# Patient Record
Sex: FEMALE | Race: WHITE | NOT HISPANIC OR LATINO | Employment: OTHER | URBAN - METROPOLITAN AREA
[De-identification: names, ages, dates, MRNs, and addresses within clinical notes are randomized per-mention and may not be internally consistent; named-entity substitution may affect disease eponyms.]

---

## 2017-12-08 ENCOUNTER — TRANSCRIBE ORDERS (OUTPATIENT)
Dept: ADMINISTRATIVE | Facility: HOSPITAL | Age: 72
End: 2017-12-08

## 2017-12-08 DIAGNOSIS — Z12.39 BREAST SCREENING: Primary | ICD-10-CM

## 2018-01-11 ENCOUNTER — HOSPITAL ENCOUNTER (OUTPATIENT)
Dept: MAMMOGRAPHY | Facility: HOSPITAL | Age: 73
Discharge: HOME | End: 2018-01-11
Admitting: FAMILY MEDICINE

## 2018-01-11 DIAGNOSIS — Z12.39 BREAST SCREENING: ICD-10-CM

## 2018-01-11 PROCEDURE — 77063 BREAST TOMOSYNTHESIS BI: CPT

## 2018-01-11 PROCEDURE — 77067 SCR MAMMO BI INCL CAD: CPT

## 2018-12-04 ENCOUNTER — TRANSCRIBE ORDERS (OUTPATIENT)
Dept: ADMINISTRATIVE | Facility: HOSPITAL | Age: 73
End: 2018-12-04

## 2018-12-04 DIAGNOSIS — Z12.39 SCREENING BREAST EXAMINATION: Primary | ICD-10-CM

## 2019-01-15 ENCOUNTER — HOSPITAL ENCOUNTER (OUTPATIENT)
Dept: MAMMOGRAPHY | Facility: HOSPITAL | Age: 74
Discharge: HOME | End: 2019-01-15
Admitting: FAMILY MEDICINE

## 2019-01-15 DIAGNOSIS — Z12.39 SCREENING BREAST EXAMINATION: ICD-10-CM

## 2019-01-15 PROCEDURE — 77063 BREAST TOMOSYNTHESIS BI: CPT

## 2019-01-15 PROCEDURE — 77067 SCR MAMMO BI INCL CAD: CPT

## 2019-12-19 RX ORDER — LOVASTATIN 20 MG/1
20 TABLET ORAL NIGHTLY
COMMUNITY
Start: 2014-06-03

## 2019-12-19 RX ORDER — LOPERAMIDE HYDROCHLORIDE 2 MG/1
2 CAPSULE ORAL DAILY
COMMUNITY
Start: 2015-10-08 | End: 2020-06-30

## 2019-12-19 RX ORDER — METOPROLOL SUCCINATE 50 MG/1
50 TABLET, EXTENDED RELEASE ORAL DAILY
COMMUNITY
Start: 2014-06-03 | End: 2019-12-20 | Stop reason: SDUPTHER

## 2019-12-19 RX ORDER — CITALOPRAM 20 MG/1
20 TABLET ORAL DAILY
COMMUNITY
Start: 2018-08-15

## 2019-12-19 RX ORDER — LEVOTHYROXINE SODIUM 88 UG/1
88 TABLET ORAL DAILY
COMMUNITY
Start: 2018-12-14

## 2019-12-19 RX ORDER — ESTRADIOL 2 MG/1
2 TABLET ORAL DAILY
COMMUNITY
Start: 2014-06-03 | End: 2020-06-30

## 2019-12-19 RX ORDER — DICLOFENAC SODIUM 75 MG/1
75 TABLET, DELAYED RELEASE ORAL 2 TIMES DAILY
COMMUNITY
Start: 2014-06-03 | End: 2021-12-23

## 2019-12-20 ENCOUNTER — TELEPHONE (OUTPATIENT)
Dept: CARDIOLOGY | Facility: CLINIC | Age: 74
End: 2019-12-20

## 2019-12-20 ENCOUNTER — OFFICE VISIT (OUTPATIENT)
Dept: CARDIOLOGY | Facility: CLINIC | Age: 74
End: 2019-12-20

## 2019-12-20 VITALS
DIASTOLIC BLOOD PRESSURE: 88 MMHG | SYSTOLIC BLOOD PRESSURE: 184 MMHG | HEIGHT: 62 IN | BODY MASS INDEX: 26.5 KG/M2 | HEART RATE: 68 BPM | WEIGHT: 144 LBS

## 2019-12-20 DIAGNOSIS — R94.31 ABNORMAL EKG: ICD-10-CM

## 2019-12-20 DIAGNOSIS — Z01.810 PREOP CARDIOVASCULAR EXAM: Primary | ICD-10-CM

## 2019-12-20 PROCEDURE — 93000 ELECTROCARDIOGRAM COMPLETE: CPT | Performed by: INTERNAL MEDICINE

## 2019-12-20 PROCEDURE — 99204 OFFICE O/P NEW MOD 45 MIN: CPT | Performed by: INTERNAL MEDICINE

## 2019-12-20 RX ORDER — METOPROLOL SUCCINATE 50 MG/1
50 TABLET, EXTENDED RELEASE ORAL 2 TIMES DAILY
Qty: 180 TABLET | Refills: 3 | Status: SHIPPED | OUTPATIENT
Start: 2019-12-20 | End: 2020-10-06

## 2019-12-20 NOTE — PROGRESS NOTES
Date of Office Visit: 2019  Encounter Provider: Adenike Pelletier MD  Place of Service: [facility] CARDIOLOGY  Patient Name: Janna Moy  :1945    Chief complaint  Consult requested by Dr. Alcantara for preoperative clearance prior to left total hip arthroplasty.    History of Present Illness  Patient is a 74-year-old gentleman with history of hypertension hyperlipidemia deep venous thrombosis and hypothyroidism.  She is scheduled for knee surgery on  with Dr. Guadarrama.  She is fairly limited in her activity due to hip pain.  She denies any chest pain, palpitations, syncope near syncope shortness of breath.  She believes her blood pressures lower in the 130s to 140s at home though her device has not been checked regularly.  She was started on lisinopril 2 months ago.  Her EKG was noted to be abnormal with poor R wave progression in the septal leads and mild ST depression in the lateral leads noted on today's EKG.    Past Medical History:   Diagnosis Date   • Deep vein thrombosis (CMS/HCC)    • Hyperlipidemia    • Hypertension    • Hypothyroidism      No past surgical history on file.  Outpatient Medications Prior to Visit   Medication Sig Dispense Refill   • aspirin 81 MG tablet Take 81 mg by mouth Daily.     • citalopram (CeleXA) 20 MG tablet Take 20 mg by mouth Daily.     • diclofenac (VOLTAREN) 75 MG EC tablet Take 75 mg by mouth Daily.     • estradiol (ESTRACE) 2 MG tablet Take 2 mg by mouth Daily.     • levothyroxine (SYNTHROID, LEVOTHROID) 125 MCG tablet TAKE 1 TABLET EVERY DAY     • loperamide (IMODIUM) 2 MG capsule Take 2 mg by mouth Daily.     • lovastatin (MEVACOR) 20 MG tablet Take 20 mg by mouth Every Night.     • metoprolol succinate XL (TOPROL-XL) 50 MG 24 hr tablet Take 50 mg by mouth Daily.       No facility-administered medications prior to visit.        Allergies as of 2019 - Reviewed 2019   Allergen Reaction Noted   • Codeine Delirium  "2014   • Sulfa antibiotics Other (See Comments) 2012     Social History     Socioeconomic History   • Marital status:      Spouse name: Not on file   • Number of children: Not on file   • Years of education: Not on file   • Highest education level: Not on file   Tobacco Use   • Smoking status: Former Smoker     Types: Cigarettes     Last attempt to quit:      Years since quittin.9     Family History   Problem Relation Age of Onset   • Hypertension Father    • Breast cancer Neg Hx      Review of Systems   Constitution: Negative for fever, malaise/fatigue, weight gain and weight loss.   HENT: Negative for ear pain, hearing loss, nosebleeds and sore throat.    Eyes: Negative for double vision, pain, vision loss in left eye and vision loss in right eye.   Cardiovascular:        See history of present illness.   Respiratory: Negative for cough, shortness of breath, sleep disturbances due to breathing, snoring and wheezing.    Endocrine: Negative for cold intolerance, heat intolerance and polyuria.   Skin: Negative for itching, poor wound healing and rash.   Musculoskeletal: Negative for joint pain, joint swelling and myalgias.   Gastrointestinal: Negative for abdominal pain, diarrhea, hematochezia, nausea and vomiting.   Genitourinary: Negative for hematuria and hesitancy.   Neurological: Negative for numbness, paresthesias and seizures.   Psychiatric/Behavioral: Negative for depression. The patient is not nervous/anxious.         Objective:     Vitals:    19 0757 19 0814   BP: (!) 188/92 (!) 184/88   BP Location: Right arm Left arm   Pulse: 68    Weight: 65.3 kg (144 lb)    Height: 157.5 cm (62\")      Body mass index is 26.34 kg/m².    Physical Exam   Constitutional: She is oriented to person, place, and time. She appears well-developed and well-nourished.   HENT:   Head: Normocephalic.   Nose: Nose normal.   Mouth/Throat: Oropharynx is clear and moist.   Eyes: Pupils are equal, " round, and reactive to light. Conjunctivae and EOM are normal. Right eye exhibits no discharge. No scleral icterus.   Neck: Normal range of motion. Neck supple. No JVD present. No thyromegaly present.   Cardiovascular: Normal rate, regular rhythm, normal heart sounds and intact distal pulses. Exam reveals no gallop and no friction rub.   No murmur heard.  Pulses:       Carotid pulses are 2+ on the right side, and 2+ on the left side.       Radial pulses are 2+ on the right side, and 2+ on the left side.        Femoral pulses are 2+ on the right side, and 2+ on the left side.       Popliteal pulses are 2+ on the right side, and 2+ on the left side.        Dorsalis pedis pulses are 2+ on the right side, and 2+ on the left side.        Posterior tibial pulses are 2+ on the right side, and 2+ on the left side.   Pulmonary/Chest: Effort normal and breath sounds normal. No respiratory distress. She has no wheezes. She has no rales.   Abdominal: Soft. Bowel sounds are normal. She exhibits no distension. There is no hepatosplenomegaly. There is no tenderness. There is no rebound.   Musculoskeletal: Normal range of motion. She exhibits no edema or tenderness.   Neurological: She is alert and oriented to person, place, and time.   Skin: Skin is warm and dry. No rash noted. No erythema.   Psychiatric: She has a normal mood and affect. Her behavior is normal. Judgment and thought content normal.   Vitals reviewed.    Lab Review:     ECG 12 Lead  Date/Time: 12/20/2019 8:01 AM  Performed by: Adenike Pelletier MD  Authorized by: Adenike Pelletier MD   Comparison: not compared with previous ECG   Rhythm: sinus rhythm    Clinical impression: normal ECG          Assessment:       Diagnosis Plan   1. Preop cardiovascular exam  ECG 12 Lead    Stress Test With Myocardial Perfusion One Day   2. Abnormal EKG  Stress Test With Myocardial Perfusion One Day     Plan:       1.  Preoperative clearance.  Recommended increasing her Toprol to 50 mg twice  daily for better blood pressure control and check a Lexiscan Cardiolite stress test early next week.  If this is normal, she can proceed on with surgery.    2.  Hypertension.  Quite elevated today and she will bring in her cuff for comparison.  Will increase metoprolol as above.  3.  Hyperlipidemia, controlled  4.  Hypothyroidism.  TSH level suggesting over suppression.  She will discuss further with Dr. Alcantara.  5.  Arthritis.  6.  Chronic aspirin use.  She has been using this prophylactically.  I reviewed with her that as long as there is no evidence of significant coronary disease (normal stress test) risks may be greater than benefits.         Your medication list           Accurate as of December 20, 2019 11:59 PM. If you have any questions, ask your nurse or doctor.               CHANGE how you take these medications      Instructions Last Dose Given Next Dose Due   metoprolol succinate XL 50 MG 24 hr tablet  Commonly known as:  TOPROL-XL  What changed:  when to take this  Changed by:  Adenike Pelletier MD      Take 1 tablet by mouth 2 (Two) Times a Day.          CONTINUE taking these medications      Instructions Last Dose Given Next Dose Due   aspirin 81 MG tablet      Take 81 mg by mouth Daily.       citalopram 20 MG tablet  Commonly known as:  CeleXA      Take 20 mg by mouth Daily.       diclofenac 75 MG EC tablet  Commonly known as:  VOLTAREN      Take 75 mg by mouth Daily.       estradiol 2 MG tablet  Commonly known as:  ESTRACE      Take 2 mg by mouth Daily.       levothyroxine 125 MCG tablet  Commonly known as:  SYNTHROID, LEVOTHROID      TAKE 1 TABLET EVERY DAY       loperamide 2 MG capsule  Commonly known as:  IMODIUM      Take 2 mg by mouth Daily.       lovastatin 20 MG tablet  Commonly known as:  MEVACOR      Take 20 mg by mouth Every Night.             Where to Get Your Medications      These medications were sent to [facility] - [facility] [address] - [phone]  -  [phone] FX  [address], [facility] [address]    Phone:  [phone]   · metoprolol succinate XL 50 MG 24 hr tablet         Patient is no longer taking -.  I corrected the med list to reflect this.  I did not stop these medications.    Dictated utilizing Dragon dictation

## 2019-12-20 NOTE — TELEPHONE ENCOUNTER
/ Prabhu, Monday is completely full on the bp/ekg schedule so I have to get approval to add another patient.  Will ask Michelle.    ROBB Martinez would like to add a bp on for Monday after 11:45am.  There arent any current openings on for Monday 12/23.    Thanks

## 2019-12-22 PROBLEM — R94.31 ABNORMAL EKG: Status: ACTIVE | Noted: 2019-12-22

## 2019-12-22 PROBLEM — Z01.810 PREOP CARDIOVASCULAR EXAM: Status: ACTIVE | Noted: 2019-12-22

## 2019-12-23 ENCOUNTER — TELEPHONE (OUTPATIENT)
Dept: CARDIOLOGY | Facility: CLINIC | Age: 74
End: 2019-12-23

## 2019-12-23 ENCOUNTER — HOSPITAL ENCOUNTER (OUTPATIENT)
Dept: CARDIOLOGY | Facility: HOSPITAL | Age: 74
Discharge: HOME | End: 2019-12-23
Admitting: INTERNAL MEDICINE

## 2019-12-23 VITALS — WEIGHT: 138 LBS | BODY MASS INDEX: 25.4 KG/M2 | HEIGHT: 62 IN

## 2019-12-23 DIAGNOSIS — R94.31 ABNORMAL EKG: ICD-10-CM

## 2019-12-23 DIAGNOSIS — Z01.810 PREOP CARDIOVASCULAR EXAM: ICD-10-CM

## 2019-12-23 LAB
BH CV NUCLEAR PRIOR STUDY: 2
BH CV STRESS BP STAGE 1: NORMAL
BH CV STRESS COMMENTS STAGE 1: NORMAL
BH CV STRESS DOSE REGADENOSON STAGE 1: 0.4
BH CV STRESS DURATION MIN STAGE 1: 0
BH CV STRESS DURATION SEC STAGE 1: 10
BH CV STRESS HR STAGE 1: 88
BH CV STRESS PROTOCOL 1: NORMAL
BH CV STRESS RECOVERY BP: NORMAL MMHG
BH CV STRESS RECOVERY HR: 80 BPM
BH CV STRESS STAGE 1: 1
LV EF NUC BP: 61 %
MAXIMAL PREDICTED HEART RATE: 145 BPM
PERCENT MAX PREDICTED HR: 60.69 %
STRESS BASELINE BP: NORMAL MMHG
STRESS BASELINE HR: 62 BPM
STRESS PERCENT HR: 71 %
STRESS POST EXERCISE DUR SEC: 10 SEC
STRESS POST PEAK BP: NORMAL MMHG
STRESS POST PEAK HR: 88 BPM
STRESS TARGET HR: 123 BPM

## 2019-12-23 PROCEDURE — A9502 TC99M TETROFOSMIN: HCPCS | Performed by: INTERNAL MEDICINE

## 2019-12-23 PROCEDURE — 93017 CV STRESS TEST TRACING ONLY: CPT

## 2019-12-23 PROCEDURE — 25010000002 REGADENOSON 0.4 MG/5ML SOLUTION: Performed by: INTERNAL MEDICINE

## 2019-12-23 PROCEDURE — 93016 CV STRESS TEST SUPVJ ONLY: CPT | Performed by: INTERNAL MEDICINE

## 2019-12-23 PROCEDURE — 93018 CV STRESS TEST I&R ONLY: CPT | Performed by: INTERNAL MEDICINE

## 2019-12-23 PROCEDURE — 78452 HT MUSCLE IMAGE SPECT MULT: CPT

## 2019-12-23 PROCEDURE — 78452 HT MUSCLE IMAGE SPECT MULT: CPT | Performed by: INTERNAL MEDICINE

## 2019-12-23 PROCEDURE — 0 TECHNETIUM TETROFOSMIN KIT: Performed by: INTERNAL MEDICINE

## 2019-12-23 RX ADMIN — REGADENOSON 0.4 MG: 0.08 INJECTION, SOLUTION INTRAVENOUS at 12:33

## 2019-12-23 RX ADMIN — TETROFOSMIN 1 DOSE: 1.38 INJECTION, POWDER, LYOPHILIZED, FOR SOLUTION INTRAVENOUS at 11:45

## 2019-12-23 RX ADMIN — TETROFOSMIN 1 DOSE: 1.38 INJECTION, POWDER, LYOPHILIZED, FOR SOLUTION INTRAVENOUS at 12:33

## 2019-12-23 NOTE — TELEPHONE ENCOUNTER
12/23/19  Vmsg from Erin with Dr. Guadarrama's ofc., left vmsg - asking for cardiac clearance for total hip surgery scheduled for 12/30/19.  Patient had stress test today to clear.  Erin's ph [phone] and their fax is [phone]/olman

## 2019-12-24 NOTE — TELEPHONE ENCOUNTER
12/24/19  I spoke with patient - informed her that she is cleared for the surgery.  I called the surgeon's ofc. - that ofc. Is closed today for the holidays, so I left a sg for Erin, with Dr. Guadarrama, that we are faxing the clearance today to [phone]./olman

## 2019-12-24 NOTE — TELEPHONE ENCOUNTER
These let the patient know that the stress test is normal.  Okay to proceed with surgery but also find out how is her blood pressure been.[email or web address]

## 2020-03-05 ENCOUNTER — TRANSCRIBE ORDERS (OUTPATIENT)
Dept: PULMONOLOGY | Facility: HOSPITAL | Age: 75
End: 2020-03-05

## 2020-03-05 DIAGNOSIS — Z12.31 VISIT FOR SCREENING MAMMOGRAM: Primary | ICD-10-CM

## 2020-03-13 ENCOUNTER — HOSPITAL ENCOUNTER (OUTPATIENT)
Dept: MAMMOGRAPHY | Facility: HOSPITAL | Age: 75
Discharge: HOME | End: 2020-03-13
Admitting: FAMILY MEDICINE

## 2020-03-13 DIAGNOSIS — Z12.31 VISIT FOR SCREENING MAMMOGRAM: ICD-10-CM

## 2020-03-13 PROCEDURE — 77063 BREAST TOMOSYNTHESIS BI: CPT

## 2020-03-13 PROCEDURE — 77067 SCR MAMMO BI INCL CAD: CPT

## 2020-06-18 ENCOUNTER — TELEPHONE (OUTPATIENT)
Dept: CARDIOLOGY | Facility: CLINIC | Age: 75
End: 2020-06-18

## 2020-06-18 NOTE — TELEPHONE ENCOUNTER
Amy called from Dr. Evans office. Pt was last seen in 12/2019. Would the pt need to came back in for another surgery clearance? If so I will call pt and make an appointment. If not the doctors office has faxed over a letter to be signed.     If any questions call [phone] ext [phone] Pili    Thanks   Chandana

## 2020-06-30 ENCOUNTER — OFFICE VISIT (OUTPATIENT)
Dept: CARDIOLOGY | Facility: CLINIC | Age: 75
End: 2020-06-30

## 2020-06-30 VITALS
WEIGHT: 143 LBS | SYSTOLIC BLOOD PRESSURE: 164 MMHG | DIASTOLIC BLOOD PRESSURE: 84 MMHG | BODY MASS INDEX: 26.31 KG/M2 | HEIGHT: 62 IN | HEART RATE: 66 BPM

## 2020-06-30 DIAGNOSIS — I10 ESSENTIAL HYPERTENSION: ICD-10-CM

## 2020-06-30 DIAGNOSIS — Z01.810 PREOP CARDIOVASCULAR EXAM: Primary | ICD-10-CM

## 2020-06-30 DIAGNOSIS — R94.31 ABNORMAL EKG: ICD-10-CM

## 2020-06-30 PROCEDURE — 93000 ELECTROCARDIOGRAM COMPLETE: CPT | Performed by: NURSE PRACTITIONER

## 2020-06-30 PROCEDURE — 99214 OFFICE O/P EST MOD 30 MIN: CPT | Performed by: NURSE PRACTITIONER

## 2020-07-01 PROBLEM — I10 ESSENTIAL HYPERTENSION: Status: ACTIVE | Noted: 2020-07-01

## 2020-09-09 ENCOUNTER — OFFICE VISIT (OUTPATIENT)
Dept: GASTROENTEROLOGY | Facility: CLINIC | Age: 75
End: 2020-09-09

## 2020-09-09 VITALS — BODY MASS INDEX: 25.73 KG/M2 | HEIGHT: 62 IN | TEMPERATURE: 97.8 F | WEIGHT: 139.8 LBS

## 2020-09-09 DIAGNOSIS — K59.1 FUNCTIONAL DIARRHEA: ICD-10-CM

## 2020-09-09 DIAGNOSIS — K21.9 GERD WITHOUT ESOPHAGITIS: Primary | ICD-10-CM

## 2020-09-09 PROCEDURE — 99203 OFFICE O/P NEW LOW 30 MIN: CPT | Performed by: NURSE PRACTITIONER

## 2020-09-09 RX ORDER — OMEPRAZOLE 40 MG/1
40 CAPSULE, DELAYED RELEASE ORAL DAILY
Qty: 90 CAPSULE | Refills: 3 | Status: SHIPPED | OUTPATIENT
Start: 2020-09-09

## 2020-09-09 RX ORDER — HYDROCODONE BITARTRATE AND ACETAMINOPHEN 7.5; 325 MG/1; MG/1
TABLET ORAL
COMMUNITY
Start: 2020-09-03 | End: 2020-12-01 | Stop reason: ALTCHOICE

## 2020-09-09 RX ORDER — ONDANSETRON 4 MG/1
TABLET, ORALLY DISINTEGRATING ORAL
COMMUNITY
Start: 2020-07-01 | End: 2020-12-01

## 2020-09-09 RX ORDER — FAMOTIDINE 40 MG/1
40 TABLET, FILM COATED ORAL DAILY PRN
COMMUNITY
Start: 2020-09-06

## 2020-09-09 RX ORDER — LISINOPRIL 20 MG/1
TABLET ORAL
COMMUNITY
Start: 2020-08-24 | End: 2020-12-01

## 2020-09-09 NOTE — PATIENT INSTRUCTIONS
Use over the counter immodium before you go out to prevent diarrhea episodes as needed.    Don't eat late, don't eat fried and don't eat too much at one time. Avoid tomato based products like pizza, lasagna, spagetti.  If you want to have these take an over the counter Pepcid immediately before you eat them.  Do not eat within 3-4 hrs of bedtime. If reflux is severe elevate the head of the bed. You may also use TUMS, maalox, or mylanta for breakthrough heartburn.    Take a daily probiotic for your gut as well.    Drink lots of water, eat a high fiber diet with 25-30gm a day(check the fiber content in the foods you eat), and get regular exercise.     Protein bar with 15gm of fiber daily with big glass of water.

## 2020-09-09 NOTE — PROGRESS NOTES
PATIENT INFORMATION  Janna Moy       - 1945    CHIEF COMPLAINT  Chief Complaint   Patient presents with   • Weight Loss   • Constipation   • Diarrhea       HISTORY OF PRESENT ILLNESS  c/o wt loss and bowel changes: DIARRHEA in [employer] wears a pad, dx with ibs both when she was younger. Reports occasional epigastric ache and takes famotidine sometimes but not often.  Has been on diclofenac since the .     history of hypertension, hyperlipidemia, DVT, hypothyroidism, diclofenac 75mg bid for hip&knee/arthritis pain, ?TKA planned? 2019 Echo  EF61%    Weight                        2019 139      Weight (lbs)                  144 lb                  138 lb                  143 lb           2014 Dr. Jorge nml colonoscopy w bx for diarrhea and bowel hbt changes    Has her 16y.o. Great grandchild who she is raising.             PERTINENT LABS  Lab Results   Component Value Date    HGB 9.7 (L) 2020    MCV 91.8 2020     2020    ALT 15 2019    AST 23 2019    HGBA1C 5.5 2020    TRIG 117 10/30/2019        REVIEW OF SYSTEMS  Review of Systems   Constitutional: Positive for unexpected weight change.   Gastrointestinal: Positive for constipation and diarrhea.   All other systems reviewed and are negative.        ACTIVE PROBLEMS  Patient Active Problem List    Diagnosis   • GERD without esophagitis [K21.9]   • Functional diarrhea [K59.1]   • Essential hypertension [I10]   • Preop cardiovascular exam [Z01.810]   • Abnormal EKG [R94.31]         PAST MEDICAL HISTORY  Past Medical History:   Diagnosis Date   • Deep vein thrombosis (CMS/HCC)    • Hyperlipidemia    • Hypertension    • Hypothyroidism          SURGICAL HISTORY  Past Surgical History:   Procedure Laterality Date   • COLONOSCOPY           FAMILY HISTORY  Family History   Problem Relation Age of Onset   • Hypertension Father    • Breast  "cancer Neg Hx    • Colon cancer Neg Hx    • Colon polyps Neg Hx          SOCIAL HISTORY  Social History     Occupational History   • Not on file   Tobacco Use   • Smoking status: Former Smoker     Types: Cigarettes     Last attempt to quit:      Years since quittin.7   • Smokeless tobacco: Never Used   Substance and Sexual Activity   • Alcohol use: Not on file   • Drug use: Not on file   • Sexual activity: Not on file         CURRENT MEDICATIONS    Current Outpatient Medications:   •  apixaban (ELIQUIS) 2.5 MG tablet tablet, Take 2.5 mg by mouth., Disp: , Rfl:   •  lisinopril (PRINIVIL,ZESTRIL) 20 MG tablet, TAKE 1 TABLET EVERY DAY, Disp: , Rfl:   •  citalopram (CeleXA) 20 MG tablet, Take 20 mg by mouth Daily., Disp: , Rfl:   •  diclofenac (VOLTAREN) 75 MG EC tablet, Take 75 mg by mouth 2 (Two) Times a Day., Disp: , Rfl:   •  famotidine (PEPCID) 40 MG tablet, , Disp: , Rfl:   •  HYDROcodone-acetaminophen (NORCO) 7.5-325 MG per tablet, TAKE 1 2 TABS BY MOUTH EVERY 6 HOURS AS NEEDED FOR PAIN, Disp: , Rfl:   •  levothyroxine (SYNTHROID, LEVOTHROID) 88 MCG tablet, Take 88 mcg by mouth Daily., Disp: , Rfl:   •  lovastatin (MEVACOR) 20 MG tablet, Take 20 mg by mouth Every Night., Disp: , Rfl:   •  metoprolol succinate XL (TOPROL-XL) 50 MG 24 hr tablet, Take 1 tablet by mouth 2 (Two) Times a Day., Disp: 180 tablet, Rfl: 3  •  omeprazole (priLOSEC) 40 MG capsule, Take 1 capsule by mouth Daily., Disp: 90 capsule, Rfl: 3  •  ondansetron ODT (ZOFRAN-ODT) 4 MG disintegrating tablet, TAKE 1 TABLET BY MOUTH EVERY 4 TO 6 HOURS AS NEEDED, Disp: , Rfl:     ALLERGIES  Codeine and Sulfa antibiotics    VITALS  Vitals:    20 1003   Temp: 97.8 °F (36.6 °C)   TempSrc: Temporal   Weight: 63.4 kg (139 lb 12.8 oz)   Height: 157.5 cm (62.01\")       LAST RESULTS   Hospital Outpatient Visit on 2019   Component Date Value Ref Range Status   • Nuclear Prior Study 2019 2   Final   •  CV STRESS PROTOCOL 1 2019 " "Pharmacologic   Final   • Stage 1 12/23/2019 1   Final   • HR Stage 1 12/23/2019 88   Final   • BP Stage 1 12/23/2019 150/90   Final   • Duration Min Stage 1 12/23/2019 0   Final   • Duration Sec Stage 1 12/23/2019 10   Final   • Stress Dose Regadenoson Stage 1 12/23/2019 0.4   Final   • Stress Comments Stage 1 12/23/2019 10 sec bolus injection   Final   • Baseline HR 12/23/2019 62  bpm Final   • Baseline BP 12/23/2019 162/78  mmHg Final   • Peak HR 12/23/2019 88  bpm Final   • Percent Max Pred HR 12/23/2019 60.69  % Final   • Percent Target HR 12/23/2019 71  % Final   • Peak BP 12/23/2019 150/90  mmHg Final   • Recovery HR 12/23/2019 80  bpm Final   • Recovery BP 12/23/2019 142/90  mmHg Final   • Target HR (85%) 12/23/2019 123  bpm Final   • Max. Pred. HR (100%) 12/23/2019 145  bpm Final   • Exercise duration (sec) 12/23/2019 10  sec Final   • Nuc Stress EF 12/23/2019 61  % Final     No results found.    PHYSICAL EXAM  Debilities/Disabilities Identified: None  Emotional Behavior: Appropriate  Wt Readings from Last 3 Encounters:   09/09/20 63.4 kg (139 lb 12.8 oz)   06/30/20 64.9 kg (143 lb)   12/23/19 62.6 kg (138 lb)     Ht Readings from Last 1 Encounters:   09/09/20 157.5 cm (62.01\")     Body mass index is 25.56 kg/m².  Physical Exam   Constitutional: She is oriented to person, place, and time. She appears well-developed and well-nourished.   HENT:   Head: Normocephalic and atraumatic.   Eyes: Pupils are equal, round, and reactive to light. Conjunctivae are normal.   Neck: Normal range of motion. Neck supple.   Cardiovascular: Normal rate and regular rhythm.   Pulmonary/Chest: Effort normal and breath sounds normal.   Abdominal: Soft. Bowel sounds are normal. She exhibits no distension. There is tenderness in the right upper quadrant and epigastric area.   Musculoskeletal: Normal range of motion.   Lymphadenopathy:     She has no cervical adenopathy.   Neurological: She is alert and oriented to person, place, and " "time.   Skin: Skin is warm and dry.   Psychiatric: She has a normal mood and affect. Her behavior is normal.   Nursing note and vitals reviewed.      CLINICAL DATA REVIEWED   reviewed previous lab results and integrated with today's visit, reviewed notes from other physicians and/or last GI encounter, reviewed previous endoscopy results and available photos, reviewed surgical pathology results from previous biopsies    ASSESSMENT  Diagnoses and all orders for this visit:    GERD without esophagitis  -     omeprazole (priLOSEC) 40 MG capsule; Take 1 capsule by mouth Daily.    Functional diarrhea    Other orders  -     lisinopril (PRINIVIL,ZESTRIL) 20 MG tablet; TAKE 1 TABLET EVERY DAY  -     HYDROcodone-acetaminophen (NORCO) 7.5-325 MG per tablet; TAKE 1 2 TABS BY MOUTH EVERY 6 HOURS AS NEEDED FOR PAIN  -     famotidine (PEPCID) 40 MG tablet  -     apixaban (ELIQUIS) 2.5 MG tablet tablet; Take 2.5 mg by mouth.  -     ondansetron ODT (ZOFRAN-ODT) 4 MG disintegrating tablet; TAKE 1 TABLET BY MOUTH EVERY 4 TO 6 HOURS AS NEEDED          PLAN  No follow-ups on file.     Just had TKA in July and is doing well.  Still has \"Horrible\" arthritis and wants to stay on diclofenac.  Will try PPI.     Use over the counter immodium before you go out to prevent diarrhea episodes as needed.    Don't eat late, don't eat fried and don't eat too much at one time. Avoid tomato based products like pizza, lasagna, spagetti.  If you want to have these take an over the counter Pepcid immediately before you eat them.  Do not eat within 3-4 hrs of bedtime. If reflux is severe elevate the head of the bed. You may also use TUMS, maalox, or mylanta for breakthrough heartburn.    Take a daily probiotic for your gut as well.    Drink lots of water, eat a high fiber diet with 25-30gm a day(check the fiber content in the foods you eat), and get regular exercise.     Protein bar with 15gm of fiber daily with big glass of water.     I have discussed the " above plan with the patient.  They verbalize understanding and are in agreement with the plan.  They have been advised to contact the office for any questions, concerns, or changes related to their health.

## 2020-10-06 RX ORDER — METOPROLOL SUCCINATE 50 MG/1
TABLET, EXTENDED RELEASE ORAL
Qty: 180 TABLET | Refills: 3 | Status: SHIPPED | OUTPATIENT
Start: 2020-10-06 | End: 2021-06-15 | Stop reason: SDUPTHER

## 2020-12-01 ENCOUNTER — OFFICE VISIT (OUTPATIENT)
Dept: CARDIOLOGY | Facility: CLINIC | Age: 75
End: 2020-12-01

## 2020-12-01 VITALS
HEIGHT: 62 IN | DIASTOLIC BLOOD PRESSURE: 80 MMHG | WEIGHT: 152 LBS | HEART RATE: 63 BPM | SYSTOLIC BLOOD PRESSURE: 148 MMHG | BODY MASS INDEX: 27.97 KG/M2

## 2020-12-01 DIAGNOSIS — I10 ESSENTIAL HYPERTENSION: Primary | ICD-10-CM

## 2020-12-01 PROBLEM — R94.31 ABNORMAL EKG: Status: RESOLVED | Noted: 2019-12-22 | Resolved: 2020-12-01

## 2020-12-01 PROBLEM — Z01.810 PREOP CARDIOVASCULAR EXAM: Status: RESOLVED | Noted: 2019-12-22 | Resolved: 2020-12-01

## 2020-12-01 PROCEDURE — 93000 ELECTROCARDIOGRAM COMPLETE: CPT | Performed by: INTERNAL MEDICINE

## 2020-12-01 PROCEDURE — 99214 OFFICE O/P EST MOD 30 MIN: CPT | Performed by: INTERNAL MEDICINE

## 2020-12-01 RX ORDER — ESTRADIOL 2 MG/1
2 TABLET ORAL DAILY
Status: ON HOLD | COMMUNITY
End: 2022-02-18

## 2020-12-01 RX ORDER — LISINOPRIL AND HYDROCHLOROTHIAZIDE 20; 12.5 MG/1; MG/1
1 TABLET ORAL DAILY
Qty: 90 TABLET | Refills: 1 | Status: SHIPPED | OUTPATIENT
Start: 2020-12-01 | End: 2021-02-12 | Stop reason: SDUPTHER

## 2020-12-01 NOTE — PROGRESS NOTES
Date of Office Visit: 2020  Encounter Provider: Gonzalez Bautista MD  Place of Service: [facility] CARDIOLOGY  Patient Name: Janna Moy  :1945    Chief Complaint   Patient presents with   • Hypertension   :     HPI:     Ms. Moy is 75 y.o. and presents today to establish care with me.  She was previously seen by one of my partners but she wished to switch to the [place] office.    In 2019, she was evaluated for preoperative risk assessment due to an abnormal EKG (which showed some nonspecific ST flattening, likely due to hypertension). She had a normal perfusion stress test at that time and had surgery without issue.  She was then seen again in 2020 for preoperative risk assessment for the same surgery, and no testing was ordered, and surgery proceeded without issue.    She denies any cardiac symptoms of chest pain, dyspnea, leg swelling, palpitations, lightheadedness, or syncope. She is very active. She does not check her BP at home.       Past Medical History:   Diagnosis Date   • Deep vein thrombosis (CMS/HCC)    • Functional diarrhea 2020   • History of total knee arthroplasty    • Hyperlipidemia    • Hypertension    • Hypothyroidism        Past Surgical History:   Procedure Laterality Date   • COLONOSCOPY     • HYSTERECTOMY     • REPLACEMENT TOTAL KNEE Left    • REPLACEMENT TOTAL KNEE Right    • TONSILLECTOMY     • TOTAL HIP ARTHROPLASTY Left        Social History     Socioeconomic History   • Marital status:      Spouse name: Not on file   • Number of children: Not on file   • Years of education: Not on file   • Highest education level: Not on file   Tobacco Use   • Smoking status: Former Smoker     Types: Cigarettes     Quit date:      Years since quittin.9   • Smokeless tobacco: Never Used   • Tobacco comment: caffeine use: 1 cup daily   Substance and Sexual Activity   • Alcohol use: Not Currently   • Drug use: Never  "      Family History   Problem Relation Age of Onset   • Hypertension Father    • Breast cancer Neg Hx    • Colon cancer Neg Hx    • Colon polyps Neg Hx        Review of Systems   All other systems reviewed and are negative.      Allergies   Allergen Reactions   • Codeine Delirium   • Sulfa Antibiotics Other (See Comments)     Pt had joint pain         Current Outpatient Medications:   •  citalopram (CeleXA) 20 MG tablet, Take 20 mg by mouth Daily., Disp: , Rfl:   •  diclofenac (VOLTAREN) 75 MG EC tablet, Take 75 mg by mouth 2 (Two) Times a Day., Disp: , Rfl:   •  estradiol (ESTRACE) 2 MG tablet, Take 2 mg by mouth Daily., Disp: , Rfl:   •  famotidine (PEPCID) 40 MG tablet, , Disp: , Rfl:   •  levothyroxine (SYNTHROID, LEVOTHROID) 88 MCG tablet, Take 88 mcg by mouth Daily., Disp: , Rfl:   •  lisinopril (PRINIVIL,ZESTRIL) 20 MG tablet, TAKE 1 TABLET EVERY DAY, Disp: , Rfl:   •  lovastatin (MEVACOR) 20 MG tablet, Take 20 mg by mouth Every Night., Disp: , Rfl:   •  metoprolol succinate XL (TOPROL-XL) 50 MG 24 hr tablet, TAKE 1 TABLET TWICE DAILY (Patient taking differently: Daily.), Disp: 180 tablet, Rfl: 3  •  omeprazole (priLOSEC) 40 MG capsule, Take 1 capsule by mouth Daily., Disp: 90 capsule, Rfl: 3      Objective:     Vitals:    12/01/20 1127 12/01/20 1139   BP: 180/90 148/80   Pulse: 63    Weight: 68.9 kg (152 lb)    Height: 157.5 cm (62\")      Body mass index is 27.8 kg/m².    Vitals signs reviewed.   Constitutional:       Appearance: Healthy appearance. Well-developed and not in distress.   Eyes:      Conjunctiva/sclera: Conjunctivae normal.   HENT:      Head: Normocephalic.      Nose: Nose normal.         Comments: Masked  Neck:      Musculoskeletal: Normal range of motion.      Vascular: No JVD. JVD normal.   Pulmonary:      Effort: Pulmonary effort is normal.      Breath sounds: Normal breath sounds.   Cardiovascular:      Normal rate. Regular rhythm.      Murmurs: There is no murmur.   Pulses:     Intact " distal pulses.   Abdominal:      Palpations: Abdomen is soft.      Tenderness: There is no abdominal tenderness.   Musculoskeletal: Normal range of motion.   Skin:     General: Skin is warm and dry.      Findings: No rash.   Neurological:      Mental Status: Alert, oriented to person, place, and time and oriented to person, place and time.      Cranial Nerves: No cranial nerve deficit.   Psychiatric:         Behavior: Behavior normal.         Thought Content: Thought content normal.         Judgment: Judgment normal.           ECG 12 Lead    Date/Time: 12/1/2020 11:30 AM  Performed by: Gonzalez Bautista MD  Authorized by: Gonzalez Bautista MD   Comparison: compared with previous ECG   Similar to previous ECG  Rhythm: sinus rhythm  Conduction: conduction normal  ST Flattening: V6  T Waves: T waves normal  QRS axis: normal  Other findings: non-specific ST-T wave changes    Clinical impression: non-specific ECG              Assessment:       Diagnosis Plan   1. Essential hypertension  ECG 12 Lead        Plan:       Her BP was very high upon arrival, but came down a lot upon recheck. However, it's still not controlled. Her most recent labs are not suggestive of hyperaldosteronism. I will continue metoprolol, but stop plain lisinopril and start lisinopril-HCTZ 20-12.5mg daily.  We'll have her come in for a BP check in four weeks.     Sincerely,       Gonzalez Bautista MD

## 2020-12-04 ENCOUNTER — TELEPHONE (OUTPATIENT)
Dept: CARDIOLOGY | Facility: CLINIC | Age: 75
End: 2020-12-04

## 2020-12-04 NOTE — TELEPHONE ENCOUNTER
Patient called and reports her pharmacy does not have her new RX for Lisinopril HCTZ 20-12.5 mg. She would like it called in at [facility] in [place] .    Thanks

## 2020-12-29 ENCOUNTER — DOCUMENTATION (OUTPATIENT)
Dept: CARDIOLOGY | Facility: CLINIC | Age: 75
End: 2020-12-29

## 2020-12-29 NOTE — PROGRESS NOTES
Pt reports that she had stopped Lisinopril(plain) and started lisinopril HCTZ 20-15.5 MG once daily as directed. She denies any symptoms at this time.    She does report that she has only been taking her metoprolol once daily instead of twice daily. She wants to know if she needs to resume taking this medication twice daily    L 138/76, 75 97%  R 1301/74, 75, 97%

## 2021-02-12 RX ORDER — LISINOPRIL AND HYDROCHLOROTHIAZIDE 20; 12.5 MG/1; MG/1
1 TABLET ORAL DAILY
Qty: 90 TABLET | Refills: 0 | Status: SHIPPED | OUTPATIENT
Start: 2021-02-12 | End: 2021-04-09

## 2021-04-09 RX ORDER — LISINOPRIL AND HYDROCHLOROTHIAZIDE 20; 12.5 MG/1; MG/1
TABLET ORAL
Qty: 90 TABLET | Refills: 0 | Status: SHIPPED | OUTPATIENT
Start: 2021-04-09 | End: 2021-06-15 | Stop reason: SDUPTHER

## 2021-04-19 ENCOUNTER — TRANSCRIBE ORDERS (OUTPATIENT)
Dept: ADMINISTRATIVE | Facility: HOSPITAL | Age: 76
End: 2021-04-19

## 2021-04-19 DIAGNOSIS — Z12.31 BREAST CANCER SCREENING BY MAMMOGRAM: Primary | ICD-10-CM

## 2021-04-20 ENCOUNTER — HOSPITAL ENCOUNTER (OUTPATIENT)
Dept: MAMMOGRAPHY | Facility: HOSPITAL | Age: 76
Discharge: HOME | End: 2021-04-20
Admitting: FAMILY MEDICINE

## 2021-04-20 DIAGNOSIS — Z12.31 BREAST CANCER SCREENING BY MAMMOGRAM: ICD-10-CM

## 2021-04-20 PROCEDURE — 77063 BREAST TOMOSYNTHESIS BI: CPT

## 2021-04-20 PROCEDURE — 77067 SCR MAMMO BI INCL CAD: CPT

## 2021-06-10 NOTE — PROGRESS NOTES
RM:________     PCP: Harpreet Alcantara MD    : 1945  AGE: 75 y.o.  EST PATIENT   REASON FOR VISIT/  CC:    BP Readings from Last 3 Encounters:   20 148/80   20 164/84   19 (!) 184/88        WT: ____________ BP: __________L __________R HR______    CHEST PAIN: _____________    SOA: _____________PALPS: _______________     LIGHTHEADED: ___________FATIGUE: ________________ EDEMA __________    ALLERGIES:Codeine and Sulfa antibiotics SMOKING HISTORY:  Social History     Tobacco Use   • Smoking status: Former Smoker     Types: Cigarettes     Quit date:      Years since quittin.4   • Smokeless tobacco: Never Used   • Tobacco comment: caffeine use: 1 cup daily   Substance Use Topics   • Alcohol use: Not Currently   • Drug use: Never     CAFFEINE USE_________________  ALCOHOL ______________________    Below is the patient's most recent value for Albumin, ALT, AST, BUN, Calcium, Chloride, Cholesterol, CO2, Creatinine, GFR, Glucose, HDL, Hematocrit, Hemoglobin, Hemoglobin A1C, LDL, Magnesium, Phosphorus, Platelets, Potassium, PSA, Sodium, Triglycerides, TSH and WBC.   Lab Results   Component Value Date    ALBUMIN 4.4 2020    ALT 17 2020    AST 25 2020    BUN 18 2020    CALCIUM 9.3 2020     2020    CO2 24 2020    CREATININE 0.7 2020    GLU 90 2020    HDL 57 10/30/2019    HCT 38.3 2020    HGB 11.9 (L) 2020    HGBA1C 5.5 2020    LDL 82 10/30/2019     2020    K 4.7 2020     2020    TRIG 117 10/30/2019    TSH 2.380 2020    WBC 7.54 2020          NEW DIAGNOSIS/ SURGERY/ HOSP OR ED VISITS: ______________________    __________________________________________________________________      RECENT LABS OR DIAGNOSTIC TESTING:  _____________________________    __________________________________________________________________      ASSESSMENT/ PLAN:  _______________________________________________    __________________________________________________________________

## 2021-06-15 ENCOUNTER — OFFICE VISIT (OUTPATIENT)
Dept: CARDIOLOGY | Facility: CLINIC | Age: 76
End: 2021-06-15

## 2021-06-15 VITALS
BODY MASS INDEX: 28.34 KG/M2 | WEIGHT: 154 LBS | DIASTOLIC BLOOD PRESSURE: 76 MMHG | SYSTOLIC BLOOD PRESSURE: 118 MMHG | HEIGHT: 62 IN | HEART RATE: 61 BPM

## 2021-06-15 DIAGNOSIS — I10 ESSENTIAL HYPERTENSION: Primary | ICD-10-CM

## 2021-06-15 PROCEDURE — 93000 ELECTROCARDIOGRAM COMPLETE: CPT | Performed by: INTERNAL MEDICINE

## 2021-06-15 PROCEDURE — 99213 OFFICE O/P EST LOW 20 MIN: CPT | Performed by: INTERNAL MEDICINE

## 2021-06-15 RX ORDER — METOPROLOL SUCCINATE 50 MG/1
50 TABLET, EXTENDED RELEASE ORAL 2 TIMES DAILY
Qty: 180 TABLET | Refills: 3 | Status: SHIPPED | OUTPATIENT
Start: 2021-06-15 | End: 2022-06-21

## 2021-06-15 RX ORDER — LISINOPRIL AND HYDROCHLOROTHIAZIDE 20; 12.5 MG/1; MG/1
1 TABLET ORAL DAILY
Qty: 90 TABLET | Refills: 3 | Status: ON HOLD | OUTPATIENT
Start: 2021-06-15 | End: 2022-02-18

## 2021-06-15 NOTE — PROGRESS NOTES
Date of Office Visit: 06/15/21  Encounter Provider: Gonzalez Bautista MD  Place of Service: [facility] CARDIOLOGY  Patient Name: Janna Moy  :1945    Chief Complaint   Patient presents with   • Hypertension   :     HPI:     Ms. Moy is 75 y.o. and presents today to follow up.  She established care with me in . I have reviewed prior notes and there are no changes except for any new updates described below. I have also reviewed any information entered into the medical record by the patient or by ancillary staff.     In 2019, she was evaluated for preoperative risk assessment due to an abnormal EKG (which showed some nonspecific ST flattening, likely due to hypertension). She had a normal perfusion stress test at that time and had surgery without issue.  She was then seen again in 2020 for preoperative risk assessment for the same surgery, and no testing was ordered, and surgery proceeded without issue.     When she established care with me, I adjusted her antihypertensives. She's tolerating this well. She denies any cardiac symptoms of chest pain, dyspnea, leg swelling, palpitations, lightheadedness, or syncope. She is very active.    Past Medical History:   Diagnosis Date   • Deep vein thrombosis (CMS/HCC)    • Functional diarrhea 2020   • History of total knee arthroplasty    • Hyperlipidemia    • Hypertension    • Hypothyroidism        Past Surgical History:   Procedure Laterality Date   • COLONOSCOPY     • HYSTERECTOMY     • REPLACEMENT TOTAL KNEE Left    • REPLACEMENT TOTAL KNEE Right    • TONSILLECTOMY     • TOTAL HIP ARTHROPLASTY Left        Social History     Socioeconomic History   • Marital status:      Spouse name: Not on file   • Number of children: Not on file   • Years of education: Not on file   • Highest education level: Not on file   Tobacco Use   • Smoking status: Former Smoker     Types: Cigarettes     Quit date: 1999     Years  "since quittin.4   • Smokeless tobacco: Never Used   • Tobacco comment: caffeine use: 1 cup daily   Substance and Sexual Activity   • Alcohol use: Not Currently   • Drug use: Never       Family History   Problem Relation Age of Onset   • Hypertension Father    • Breast cancer Neg Hx    • Colon cancer Neg Hx    • Colon polyps Neg Hx        Review of Systems   All other systems reviewed and are negative.      Allergies   Allergen Reactions   • Codeine Delirium   • Sulfa Antibiotics Other (See Comments)     Pt had joint pain         Current Outpatient Medications:   •  citalopram (CeleXA) 20 MG tablet, Take 20 mg by mouth Daily., Disp: , Rfl:   •  diclofenac (VOLTAREN) 75 MG EC tablet, Take 75 mg by mouth 2 (Two) Times a Day., Disp: , Rfl:   •  estradiol (ESTRACE) 2 MG tablet, Take 2 mg by mouth Daily., Disp: , Rfl:   •  famotidine (PEPCID) 40 MG tablet, , Disp: , Rfl:   •  levothyroxine (SYNTHROID, LEVOTHROID) 88 MCG tablet, Take 88 mcg by mouth Daily., Disp: , Rfl:   •  lisinopril-hydrochlorothiazide (PRINZIDE,ZESTORETIC) 20-12.5 MG per tablet, Take 1 tablet by mouth Daily., Disp: 90 tablet, Rfl: 3  •  lovastatin (MEVACOR) 20 MG tablet, Take 20 mg by mouth Every Night., Disp: , Rfl:   •  metoprolol succinate XL (TOPROL-XL) 50 MG 24 hr tablet, Take 1 tablet by mouth 2 (Two) Times a Day., Disp: 180 tablet, Rfl: 3  •  omeprazole (priLOSEC) 40 MG capsule, Take 1 capsule by mouth Daily., Disp: 90 capsule, Rfl: 3      Objective:     Vitals:    06/15/21 1430 06/15/21 1456   BP: 154/86 118/76   BP Location: Right arm    Pulse: 61    Weight: 69.9 kg (154 lb)    Height: 157.5 cm (62\")      Body mass index is 28.17 kg/m².    Vitals reviewed.   Constitutional:       Appearance: Healthy appearance. Well-developed and not in distress.   Eyes:      Conjunctiva/sclera: Conjunctivae normal.   HENT:      Head: Normocephalic.      Nose: Nose normal.         Comments: Masked  Neck:      Vascular: No JVD. JVD normal.   Pulmonary:     "  Effort: Pulmonary effort is normal.      Breath sounds: Normal breath sounds.   Cardiovascular:      Normal rate. Regular rhythm.      Murmurs: There is no murmur.   Pulses:     Intact distal pulses.   Edema:     Peripheral edema absent.   Abdominal:      Palpations: Abdomen is soft.      Tenderness: There is no abdominal tenderness.   Musculoskeletal: Normal range of motion.      Cervical back: Normal range of motion. Skin:     General: Skin is warm and dry.      Findings: No rash.   Neurological:      General: No focal deficit present.      Mental Status: Alert, oriented to person, place, and time and oriented to person, place and time.      Cranial Nerves: No cranial nerve deficit.   Psychiatric:         Behavior: Behavior normal.         Thought Content: Thought content normal.         Judgment: Judgment normal.           ECG 12 Lead    Date/Time: 6/15/2021 2:48 PM  Performed by: Gonzalez Bautista MD  Authorized by: Gonzalez Bautista MD   Comparison: compared with previous ECG   Similar to previous ECG  Rhythm: sinus rhythm  Conduction: conduction normal  ST Segments: ST segments normal  T Waves: T waves normal  QRS axis: normal  Other: no other findings    Clinical impression: normal ECG            Assessment:       Diagnosis Plan   1. Essential hypertension        Plan:       Her BP was very high upon arrival, but normalized upon recheck.  She will remain on metoprolol succinate, 50mg BID, and lisinopril-HCTZ 20-12.5mg daily.     Sincerely,       Gonzalez Bautista MD

## 2022-02-17 ENCOUNTER — TRANSCRIBE ORDERS (OUTPATIENT)
Dept: ADMINISTRATIVE | Facility: HOSPITAL | Age: 77
End: 2022-02-17

## 2022-02-17 ENCOUNTER — LAB (OUTPATIENT)
Dept: LAB | Facility: HOSPITAL | Age: 77
End: 2022-02-17

## 2022-02-17 DIAGNOSIS — N18.30 STAGE 3 CHRONIC KIDNEY DISEASE, UNSPECIFIED WHETHER STAGE 3A OR 3B CKD: Primary | ICD-10-CM

## 2022-02-17 DIAGNOSIS — N18.30 STAGE 3 CHRONIC KIDNEY DISEASE, UNSPECIFIED WHETHER STAGE 3A OR 3B CKD: ICD-10-CM

## 2022-02-17 LAB
ANION GAP SERPL CALCULATED.3IONS-SCNC: 8.7 MMOL/L (ref 5–15)
BACTERIA UR QL AUTO: ABNORMAL /HPF
BILIRUB UR QL STRIP: NEGATIVE
BUN SERPL-MCNC: 17 MG/DL (ref 8–23)
BUN/CREAT SERPL: 17.2 (ref 7–25)
CALCIUM SPEC-SCNC: 9.3 MG/DL (ref 8.6–10.5)
CHLORIDE SERPL-SCNC: 98 MMOL/L (ref 98–107)
CLARITY UR: CLEAR
CO2 SERPL-SCNC: 28.3 MMOL/L (ref 22–29)
COLOR UR: ABNORMAL
CREAT SERPL-MCNC: 0.99 MG/DL (ref 0.57–1)
CREAT UR-MCNC: 161.5 MG/DL
GFR SERPL CREATININE-BSD FRML MDRD: 54 ML/MIN/1.73
GLUCOSE SERPL-MCNC: 123 MG/DL (ref 65–99)
GLUCOSE UR STRIP-MCNC: NEGATIVE MG/DL
HGB UR QL STRIP.AUTO: NEGATIVE
HYALINE CASTS UR QL AUTO: ABNORMAL /LPF
KETONES UR QL STRIP: NEGATIVE
LEUKOCYTE ESTERASE UR QL STRIP.AUTO: ABNORMAL
NITRITE UR QL STRIP: NEGATIVE
PH UR STRIP.AUTO: <=5 [PH] (ref 5–8)
POTASSIUM SERPL-SCNC: 3.9 MMOL/L (ref 3.5–5.2)
PROT ?TM UR-MCNC: 49.2 MG/DL
PROT UR QL STRIP: ABNORMAL
PROT/CREAT UR: 304.6 MG/G CREA (ref 0–200)
RBC # UR STRIP: ABNORMAL /HPF
REF LAB TEST METHOD: ABNORMAL
SODIUM SERPL-SCNC: 135 MMOL/L (ref 136–145)
SP GR UR STRIP: 1.02 (ref 1–1.03)
SQUAMOUS #/AREA URNS HPF: ABNORMAL /HPF
UROBILINOGEN UR QL STRIP: ABNORMAL
WBC # UR STRIP: ABNORMAL /HPF

## 2022-02-17 PROCEDURE — 81001 URINALYSIS AUTO W/SCOPE: CPT | Performed by: INTERNAL MEDICINE

## 2022-02-17 PROCEDURE — 82570 ASSAY OF URINE CREATININE: CPT

## 2022-02-17 PROCEDURE — 80048 BASIC METABOLIC PNL TOTAL CA: CPT

## 2022-02-17 PROCEDURE — 36415 COLL VENOUS BLD VENIPUNCTURE: CPT

## 2022-02-17 PROCEDURE — 84156 ASSAY OF PROTEIN URINE: CPT

## 2022-02-18 ENCOUNTER — APPOINTMENT (OUTPATIENT)
Dept: CT IMAGING | Facility: HOSPITAL | Age: 77
End: 2022-02-18

## 2022-02-18 ENCOUNTER — HOSPITAL ENCOUNTER (OUTPATIENT)
Facility: HOSPITAL | Age: 77
Setting detail: OBSERVATION
LOS: 1 days | Discharge: HOME | End: 2022-02-19
Attending: EMERGENCY MEDICINE | Admitting: INTERNAL MEDICINE

## 2022-02-18 ENCOUNTER — APPOINTMENT (OUTPATIENT)
Dept: NUCLEAR MEDICINE | Facility: HOSPITAL | Age: 77
End: 2022-02-18

## 2022-02-18 DIAGNOSIS — I26.94 MULTIPLE SUBSEGMENTAL PULMONARY EMBOLI WITHOUT ACUTE COR PULMONALE: Primary | ICD-10-CM

## 2022-02-18 DIAGNOSIS — I77.1 SUBCLAVIAN ARTERY STENOSIS, LEFT: ICD-10-CM

## 2022-02-18 DIAGNOSIS — M54.2 NECK PAIN: ICD-10-CM

## 2022-02-18 DIAGNOSIS — R09.02 HYPOXIA: ICD-10-CM

## 2022-02-18 LAB
ABO GROUP BLD: NORMAL
ABO GROUP BLD: NORMAL
ALBUMIN SERPL-MCNC: 3.9 G/DL (ref 3.5–5.2)
ALBUMIN/GLOB SERPL: 1 G/DL
ALP SERPL-CCNC: 141 U/L (ref 39–117)
ALT SERPL W P-5'-P-CCNC: 18 U/L (ref 1–33)
ANION GAP SERPL CALCULATED.3IONS-SCNC: 13 MMOL/L (ref 5–15)
APTT PPP: 37.6 SECONDS (ref 24.3–38.1)
AST SERPL-CCNC: 34 U/L (ref 1–32)
BACTERIA UR QL AUTO: ABNORMAL /HPF
BASOPHILS # BLD AUTO: 0.03 10*3/MM3 (ref 0–0.2)
BASOPHILS NFR BLD AUTO: 0.2 % (ref 0–1.5)
BILIRUB SERPL-MCNC: 1.1 MG/DL (ref 0–1.2)
BILIRUB UR QL STRIP: ABNORMAL
BLD GP AB SCN SERPL QL: NEGATIVE
BUN SERPL-MCNC: 28 MG/DL (ref 8–23)
BUN/CREAT SERPL: 14.7 (ref 7–25)
CALCIUM SPEC-SCNC: 9.6 MG/DL (ref 8.6–10.5)
CHLORIDE SERPL-SCNC: 93 MMOL/L (ref 98–107)
CLARITY UR: CLEAR
CO2 SERPL-SCNC: 24 MMOL/L (ref 22–29)
COLOR UR: ABNORMAL
CREAT SERPL-MCNC: 1.91 MG/DL (ref 0.57–1)
D DIMER PPP FEU-MCNC: 6.66 MCGFEU/ML (ref 0–0.46)
DEPRECATED RDW RBC AUTO: 48 FL (ref 37–54)
EOSINOPHIL # BLD AUTO: 0.06 10*3/MM3 (ref 0–0.4)
EOSINOPHIL NFR BLD AUTO: 0.4 % (ref 0.3–6.2)
ERYTHROCYTE [DISTWIDTH] IN BLOOD BY AUTOMATED COUNT: 13.6 % (ref 12.3–15.4)
FLUAV RNA RESP QL NAA+PROBE: NOT DETECTED
FLUBV RNA RESP QL NAA+PROBE: NOT DETECTED
GFR SERPL CREATININE-BSD FRML MDRD: 25 ML/MIN/1.73
GLOBULIN UR ELPH-MCNC: 4 GM/DL
GLUCOSE SERPL-MCNC: 144 MG/DL (ref 65–99)
GLUCOSE UR STRIP-MCNC: NEGATIVE MG/DL
HCT VFR BLD AUTO: 37.1 % (ref 34–46.6)
HGB BLD-MCNC: 11.8 G/DL (ref 12–15.9)
HGB UR QL STRIP.AUTO: ABNORMAL
HYALINE CASTS UR QL AUTO: ABNORMAL /LPF
IMM GRANULOCYTES # BLD AUTO: 0.08 10*3/MM3 (ref 0–0.05)
IMM GRANULOCYTES NFR BLD AUTO: 0.6 % (ref 0–0.5)
INR PPP: 1.05 (ref 0.9–1.1)
KETONES UR QL STRIP: NEGATIVE
LEUKOCYTE ESTERASE UR QL STRIP.AUTO: ABNORMAL
LYMPHOCYTES # BLD AUTO: 1.06 10*3/MM3 (ref 0.7–3.1)
LYMPHOCYTES NFR BLD AUTO: 7.6 % (ref 19.6–45.3)
MCH RBC QN AUTO: 30.5 PG (ref 26.6–33)
MCHC RBC AUTO-ENTMCNC: 31.8 G/DL (ref 31.5–35.7)
MCV RBC AUTO: 95.9 FL (ref 79–97)
MONOCYTES # BLD AUTO: 1.27 10*3/MM3 (ref 0.1–0.9)
MONOCYTES NFR BLD AUTO: 9.1 % (ref 5–12)
NEUTROPHILS NFR BLD AUTO: 11.49 10*3/MM3 (ref 1.7–7)
NEUTROPHILS NFR BLD AUTO: 82.1 % (ref 42.7–76)
NITRITE UR QL STRIP: NEGATIVE
NRBC BLD AUTO-RTO: 0 /100 WBC (ref 0–0.2)
PH UR STRIP.AUTO: <=5 [PH] (ref 4.5–8)
PLATELET # BLD AUTO: 180 10*3/MM3 (ref 140–450)
PMV BLD AUTO: 12 FL (ref 6–12)
POTASSIUM SERPL-SCNC: 4.1 MMOL/L (ref 3.5–5.2)
PROT SERPL-MCNC: 7.9 G/DL (ref 6–8.5)
PROT UR QL STRIP: ABNORMAL
PROTHROMBIN TIME: 13.9 SECONDS (ref 12.1–15)
RBC # BLD AUTO: 3.87 10*6/MM3 (ref 3.77–5.28)
RBC # UR STRIP: ABNORMAL /HPF
REF LAB TEST METHOD: ABNORMAL
RH BLD: POSITIVE
RH BLD: POSITIVE
SARS-COV-2 RNA RESP QL NAA+PROBE: NOT DETECTED
SODIUM SERPL-SCNC: 130 MMOL/L (ref 136–145)
SP GR UR STRIP: 1.02 (ref 1–1.03)
SQUAMOUS #/AREA URNS HPF: ABNORMAL /HPF
T&S EXPIRATION DATE: NORMAL
TRANS CELLS #/AREA URNS HPF: ABNORMAL /HPF
TROPONIN T SERPL-MCNC: <0.01 NG/ML (ref 0–0.03)
TSH SERPL DL<=0.05 MIU/L-ACNC: 0.54 UIU/ML (ref 0.27–4.2)
UROBILINOGEN UR QL STRIP: ABNORMAL
WBC # UR STRIP: ABNORMAL /HPF
WBC NRBC COR # BLD: 13.99 10*3/MM3 (ref 3.4–10.8)

## 2022-02-18 PROCEDURE — 94799 UNLISTED PULMONARY SVC/PX: CPT

## 2022-02-18 PROCEDURE — 85730 THROMBOPLASTIN TIME PARTIAL: CPT | Performed by: EMERGENCY MEDICINE

## 2022-02-18 PROCEDURE — 0 TECHNETIUM ALBUMIN AGGREGATED: Performed by: EMERGENCY MEDICINE

## 2022-02-18 PROCEDURE — 86900 BLOOD TYPING SEROLOGIC ABO: CPT | Performed by: EMERGENCY MEDICINE

## 2022-02-18 PROCEDURE — 96375 TX/PRO/DX INJ NEW DRUG ADDON: CPT

## 2022-02-18 PROCEDURE — 81001 URINALYSIS AUTO W/SCOPE: CPT | Performed by: EMERGENCY MEDICINE

## 2022-02-18 PROCEDURE — 93005 ELECTROCARDIOGRAM TRACING: CPT | Performed by: EMERGENCY MEDICINE

## 2022-02-18 PROCEDURE — 84443 ASSAY THYROID STIM HORMONE: CPT | Performed by: FAMILY MEDICINE

## 2022-02-18 PROCEDURE — 86901 BLOOD TYPING SEROLOGIC RH(D): CPT | Performed by: EMERGENCY MEDICINE

## 2022-02-18 PROCEDURE — 86900 BLOOD TYPING SEROLOGIC ABO: CPT

## 2022-02-18 PROCEDURE — 71250 CT THORAX DX C-: CPT

## 2022-02-18 PROCEDURE — 84484 ASSAY OF TROPONIN QUANT: CPT | Performed by: EMERGENCY MEDICINE

## 2022-02-18 PROCEDURE — 86850 RBC ANTIBODY SCREEN: CPT | Performed by: EMERGENCY MEDICINE

## 2022-02-18 PROCEDURE — 94761 N-INVAS EAR/PLS OXIMETRY MLT: CPT

## 2022-02-18 PROCEDURE — 96374 THER/PROPH/DIAG INJ IV PUSH: CPT

## 2022-02-18 PROCEDURE — 25010000002 KETOROLAC TROMETHAMINE PER 15 MG: Performed by: EMERGENCY MEDICINE

## 2022-02-18 PROCEDURE — 87636 SARSCOV2 & INF A&B AMP PRB: CPT | Performed by: EMERGENCY MEDICINE

## 2022-02-18 PROCEDURE — 96361 HYDRATE IV INFUSION ADD-ON: CPT

## 2022-02-18 PROCEDURE — 86901 BLOOD TYPING SEROLOGIC RH(D): CPT

## 2022-02-18 PROCEDURE — 85025 COMPLETE CBC W/AUTO DIFF WBC: CPT | Performed by: EMERGENCY MEDICINE

## 2022-02-18 PROCEDURE — 78580 LUNG PERFUSION IMAGING: CPT

## 2022-02-18 PROCEDURE — 99285 EMERGENCY DEPT VISIT HI MDM: CPT

## 2022-02-18 PROCEDURE — A9540 TC99M MAA: HCPCS | Performed by: EMERGENCY MEDICINE

## 2022-02-18 PROCEDURE — 25010000002 METHYLPREDNISOLONE PER 40 MG: Performed by: FAMILY MEDICINE

## 2022-02-18 PROCEDURE — 85610 PROTHROMBIN TIME: CPT | Performed by: EMERGENCY MEDICINE

## 2022-02-18 PROCEDURE — 72125 CT NECK SPINE W/O DYE: CPT

## 2022-02-18 PROCEDURE — 99284 EMERGENCY DEPT VISIT MOD MDM: CPT | Performed by: EMERGENCY MEDICINE

## 2022-02-18 PROCEDURE — 25010000002 IOPAMIDOL 61 % SOLUTION: Performed by: EMERGENCY MEDICINE

## 2022-02-18 PROCEDURE — 74175 CTA ABDOMEN W/CONTRAST: CPT

## 2022-02-18 PROCEDURE — C9803 HOPD COVID-19 SPEC COLLECT: HCPCS

## 2022-02-18 PROCEDURE — 99220 PR INITIAL OBSERVATION CARE/DAY 70 MINUTES: CPT | Performed by: FAMILY MEDICINE

## 2022-02-18 PROCEDURE — 93010 ELECTROCARDIOGRAM REPORT: CPT | Performed by: INTERNAL MEDICINE

## 2022-02-18 PROCEDURE — 80053 COMPREHEN METABOLIC PANEL: CPT | Performed by: EMERGENCY MEDICINE

## 2022-02-18 PROCEDURE — 85379 FIBRIN DEGRADATION QUANT: CPT | Performed by: EMERGENCY MEDICINE

## 2022-02-18 RX ORDER — ACETAMINOPHEN 325 MG/1
650 TABLET ORAL ONCE
Status: COMPLETED | OUTPATIENT
Start: 2022-02-18 | End: 2022-02-18

## 2022-02-18 RX ORDER — LEVOTHYROXINE SODIUM 88 UG/1
88 TABLET ORAL
Status: DISCONTINUED | OUTPATIENT
Start: 2022-02-19 | End: 2022-02-19 | Stop reason: HOSPADM

## 2022-02-18 RX ORDER — METHOCARBAMOL 500 MG/1
500 TABLET, FILM COATED ORAL 2 TIMES DAILY PRN
Qty: 15 TABLET | Refills: 0 | Status: SHIPPED | OUTPATIENT
Start: 2022-02-18 | End: 2022-03-01 | Stop reason: HOSPADM

## 2022-02-18 RX ORDER — SODIUM CHLORIDE 9 MG/ML
40 INJECTION, SOLUTION INTRAVENOUS AS NEEDED
Status: DISCONTINUED | OUTPATIENT
Start: 2022-02-18 | End: 2022-02-19 | Stop reason: HOSPADM

## 2022-02-18 RX ORDER — SODIUM CHLORIDE 0.9 % (FLUSH) 0.9 %
1-10 SYRINGE (ML) INJECTION AS NEEDED
Status: DISCONTINUED | OUTPATIENT
Start: 2022-02-18 | End: 2022-02-19 | Stop reason: HOSPADM

## 2022-02-18 RX ORDER — METHYLPREDNISOLONE ACETATE 40 MG/ML
40 INJECTION, SUSPENSION INTRA-ARTICULAR; INTRALESIONAL; INTRAMUSCULAR; SOFT TISSUE ONCE
Status: COMPLETED | OUTPATIENT
Start: 2022-02-18 | End: 2022-02-18

## 2022-02-18 RX ORDER — METOPROLOL SUCCINATE 50 MG/1
50 TABLET, EXTENDED RELEASE ORAL 2 TIMES DAILY
Status: DISCONTINUED | OUTPATIENT
Start: 2022-02-18 | End: 2022-02-19 | Stop reason: HOSPADM

## 2022-02-18 RX ORDER — CITALOPRAM 20 MG/1
20 TABLET ORAL DAILY
Status: DISCONTINUED | OUTPATIENT
Start: 2022-02-19 | End: 2022-02-19 | Stop reason: HOSPADM

## 2022-02-18 RX ORDER — ONDANSETRON 2 MG/ML
4 INJECTION INTRAMUSCULAR; INTRAVENOUS EVERY 6 HOURS PRN
Status: DISCONTINUED | OUTPATIENT
Start: 2022-02-18 | End: 2022-02-19 | Stop reason: HOSPADM

## 2022-02-18 RX ORDER — BACLOFEN 10 MG/1
10 TABLET ORAL 3 TIMES DAILY
Status: DISCONTINUED | OUTPATIENT
Start: 2022-02-18 | End: 2022-02-19 | Stop reason: HOSPADM

## 2022-02-18 RX ORDER — FAMOTIDINE 20 MG/1
40 TABLET, FILM COATED ORAL DAILY
Status: DISCONTINUED | OUTPATIENT
Start: 2022-02-19 | End: 2022-02-19 | Stop reason: HOSPADM

## 2022-02-18 RX ORDER — ACETAMINOPHEN 650 MG/1
650 SUPPOSITORY RECTAL EVERY 4 HOURS PRN
Status: DISCONTINUED | OUTPATIENT
Start: 2022-02-18 | End: 2022-02-19 | Stop reason: HOSPADM

## 2022-02-18 RX ORDER — ACETAMINOPHEN 160 MG/5ML
650 SOLUTION ORAL EVERY 4 HOURS PRN
Status: DISCONTINUED | OUTPATIENT
Start: 2022-02-18 | End: 2022-02-19 | Stop reason: HOSPADM

## 2022-02-18 RX ORDER — SODIUM CHLORIDE 9 MG/ML
125 INJECTION, SOLUTION INTRAVENOUS CONTINUOUS
Status: DISCONTINUED | OUTPATIENT
Start: 2022-02-18 | End: 2022-02-18

## 2022-02-18 RX ORDER — SODIUM CHLORIDE 9 MG/ML
100 INJECTION, SOLUTION INTRAVENOUS CONTINUOUS
Status: DISCONTINUED | OUTPATIENT
Start: 2022-02-18 | End: 2022-02-19 | Stop reason: HOSPADM

## 2022-02-18 RX ORDER — METHYLPREDNISOLONE 4 MG/1
TABLET ORAL
Qty: 21 TABLET | Refills: 0 | Status: SHIPPED | OUTPATIENT
Start: 2022-02-18 | End: 2022-03-01 | Stop reason: HOSPADM

## 2022-02-18 RX ORDER — TRAMADOL HYDROCHLORIDE 50 MG/1
50 TABLET ORAL EVERY 4 HOURS PRN
Status: DISCONTINUED | OUTPATIENT
Start: 2022-02-18 | End: 2022-02-19 | Stop reason: HOSPADM

## 2022-02-18 RX ORDER — SODIUM CHLORIDE 0.9 % (FLUSH) 0.9 %
10 SYRINGE (ML) INJECTION AS NEEDED
Status: DISCONTINUED | OUTPATIENT
Start: 2022-02-18 | End: 2022-02-19 | Stop reason: HOSPADM

## 2022-02-18 RX ORDER — ACETAMINOPHEN 325 MG/1
650 TABLET ORAL EVERY 4 HOURS PRN
Status: DISCONTINUED | OUTPATIENT
Start: 2022-02-18 | End: 2022-02-19 | Stop reason: HOSPADM

## 2022-02-18 RX ORDER — PANTOPRAZOLE SODIUM 40 MG/1
40 TABLET, DELAYED RELEASE ORAL EVERY MORNING
Refills: 3 | Status: DISCONTINUED | OUTPATIENT
Start: 2022-02-19 | End: 2022-02-18

## 2022-02-18 RX ORDER — SODIUM CHLORIDE 0.9 % (FLUSH) 0.9 %
10 SYRINGE (ML) INJECTION EVERY 12 HOURS SCHEDULED
Status: DISCONTINUED | OUTPATIENT
Start: 2022-02-18 | End: 2022-02-19 | Stop reason: HOSPADM

## 2022-02-18 RX ORDER — ONDANSETRON 4 MG/1
4 TABLET, FILM COATED ORAL EVERY 6 HOURS PRN
Status: DISCONTINUED | OUTPATIENT
Start: 2022-02-18 | End: 2022-02-19 | Stop reason: HOSPADM

## 2022-02-18 RX ORDER — ERGOCALCIFEROL (VITAMIN D2) 10 MCG
400 TABLET ORAL DAILY
COMMUNITY

## 2022-02-18 RX ORDER — CHOLECALCIFEROL (VITAMIN D3) 125 MCG
5 CAPSULE ORAL NIGHTLY PRN
Status: DISCONTINUED | OUTPATIENT
Start: 2022-02-18 | End: 2022-02-19 | Stop reason: HOSPADM

## 2022-02-18 RX ORDER — KETOROLAC TROMETHAMINE 30 MG/ML
15 INJECTION, SOLUTION INTRAMUSCULAR; INTRAVENOUS ONCE
Status: COMPLETED | OUTPATIENT
Start: 2022-02-18 | End: 2022-02-18

## 2022-02-18 RX ORDER — NITROGLYCERIN 0.4 MG/1
0.4 TABLET SUBLINGUAL
Status: DISCONTINUED | OUTPATIENT
Start: 2022-02-18 | End: 2022-02-19 | Stop reason: HOSPADM

## 2022-02-18 RX ADMIN — METHYLPREDNISOLONE ACETATE 40 MG: 40 INJECTION, SUSPENSION INTRA-ARTICULAR; INTRALESIONAL; INTRAMUSCULAR; SOFT TISSUE at 23:24

## 2022-02-18 RX ADMIN — SODIUM CHLORIDE 125 ML/HR: 9 INJECTION, SOLUTION INTRAVENOUS at 18:40

## 2022-02-18 RX ADMIN — APIXABAN 10 MG: 2.5 TABLET, FILM COATED ORAL at 20:11

## 2022-02-18 RX ADMIN — ACETAMINOPHEN 650 MG: 325 TABLET, FILM COATED ORAL at 18:40

## 2022-02-18 RX ADMIN — KIT FOR THE PREPARATION OF TECHNETIUM TC 99M ALBUMIN AGGREGATED 1 DOSE: 2.5 INJECTION, POWDER, FOR SOLUTION INTRAVENOUS at 15:53

## 2022-02-18 RX ADMIN — KETOROLAC TROMETHAMINE 15 MG: 30 INJECTION, SOLUTION INTRAMUSCULAR; INTRAVENOUS at 13:08

## 2022-02-18 RX ADMIN — IOPAMIDOL 100 ML: 612 INJECTION, SOLUTION INTRAVENOUS at 18:29

## 2022-02-18 RX ADMIN — BACLOFEN 10 MG: 10 TABLET ORAL at 23:24

## 2022-02-18 RX ADMIN — SODIUM CHLORIDE 100 ML/HR: 9 INJECTION, SOLUTION INTRAVENOUS at 22:36

## 2022-02-18 RX ADMIN — SODIUM CHLORIDE, PRESERVATIVE FREE 10 ML: 5 INJECTION INTRAVENOUS at 23:26

## 2022-02-18 RX ADMIN — METOPROLOL SUCCINATE 50 MG: 50 TABLET, EXTENDED RELEASE ORAL at 23:24

## 2022-02-18 RX ADMIN — SODIUM CHLORIDE 500 ML: 9 INJECTION, SOLUTION INTRAVENOUS at 17:19

## 2022-02-18 RX ADMIN — SODIUM CHLORIDE 1000 ML: 9 INJECTION, SOLUTION INTRAVENOUS at 15:06

## 2022-02-18 NOTE — ED NOTES
Spoke with Valerie with the [facility] transfer center, On call cardiothoracic surgeon will return call.     Selvin Donato  02/18/22 1059

## 2022-02-18 NOTE — ED PROVIDER NOTES
Subjective   History of Present Illness  History of Present Illness    Chief complaint: Neck pain    Location: All across the posterior neck, down into the upper back and also up into the head    Quality/Severity: Moderate to severe pain    Timing/Duration: Persistent for the past 4 days    Modifying Factors: Worse with turning the head to either side    Narrative: This patient presents for evaluation of neck pain.  She says that she woke up on Tuesday morning with pain all across the back of her neck.  Since then the pain has increased and worsened to spread towards the upper back area and also up towards the head at times.  It hurts to turn her head to either side or to look up or down.  She denies any fevers.  She denies chest pain.  She has had some mild coughing and congestion.  She denies vomiting or diarrhea.  She took a pain pill medication at home that was prescribed to her .  It has not relieved her pain so far.    Associated Symptoms: As above    Review of Systems   Constitutional: Positive for fatigue. Negative for activity change and fever.   HENT: Positive for congestion.    Eyes: Negative for pain and visual disturbance.   Respiratory: Positive for cough. Negative for shortness of breath.    Cardiovascular: Negative for chest pain.   Gastrointestinal: Negative for abdominal pain, nausea and vomiting.   Genitourinary: Negative for dysuria, flank pain, frequency and hematuria.   Musculoskeletal: Positive for back pain, myalgias and neck pain.   Skin: Negative for color change and wound.   Neurological: Positive for weakness. Negative for syncope, numbness and headaches.   All other systems reviewed and are negative.      Past Medical History:   Diagnosis Date   • Arthritis    • Deep vein thrombosis (HCC)    • Functional diarrhea 9/9/2020   • GERD (gastroesophageal reflux disease)    • History of total knee arthroplasty    • Hyperlipidemia    • Hypertension    • Hypothyroidism        Allergies    Allergen Reactions   • Codeine Delirium   • Sulfa Antibiotics Other (See Comments)     Pt had joint pain       Past Surgical History:   Procedure Laterality Date   • COLONOSCOPY     • EYE SURGERY     • HYSTERECTOMY     • JOINT REPLACEMENT     • REPLACEMENT TOTAL KNEE Left    • REPLACEMENT TOTAL KNEE Right    • TONSILLECTOMY     • TOTAL HIP ARTHROPLASTY Left        Family History   Problem Relation Age of Onset   • Hypertension Father    • Breast cancer Neg Hx    • Colon cancer Neg Hx    • Colon polyps Neg Hx        Social History     Socioeconomic History   • Marital status:    Tobacco Use   • Smoking status: Former Smoker     Types: Cigarettes     Quit date:      Years since quittin.1   • Smokeless tobacco: Never Used   Substance and Sexual Activity   • Alcohol use: Not Currently   • Drug use: Never   • Sexual activity: Defer     ED Triage Vitals [22 1240]   Temp Heart Rate Resp BP SpO2   98.5 °F (36.9 °C) 88 16 155/79 (S) (!) 84 %      Temp src Heart Rate Source Patient Position BP Location FiO2 (%)   Oral Monitor -- -- --     Objective   Physical Exam  Vitals and nursing note reviewed.   Constitutional:       General: She is in acute distress.      Appearance: She is well-developed. She is not toxic-appearing or diaphoretic.      Comments: Lying towards her left side of the bed.  Appears uncomfortable.   HENT:      Head: Normocephalic and atraumatic.      Mouth/Throat:      Mouth: Mucous membranes are moist.   Eyes:      General:         Right eye: No discharge.         Left eye: No discharge.      Extraocular Movements: Extraocular movements intact.      Conjunctiva/sclera: Conjunctivae normal.      Pupils: Pupils are equal, round, and reactive to light.   Neck:      Vascular: No carotid bruit.      Comments: Moderate diffuse posterior and lateral tenderness to palpation all across the soft tissues of the neck.  No focal midline level of vertebral tenderness palpation is evident.  No  step-offs or deformities noted.  Patient has decreased active range of motion for flexion or extension or turning of the neck muscles.  Cardiovascular:      Rate and Rhythm: Normal rate and regular rhythm.      Pulses: Normal pulses.      Heart sounds: No murmur heard.      Pulmonary:      Effort: Pulmonary effort is normal. No respiratory distress.      Breath sounds: Normal breath sounds. No stridor. No wheezing or rhonchi.      Comments: Lungs clear bilaterally.  Normal work of breathing.  Chest:      Chest wall: No tenderness.   Abdominal:      Palpations: Abdomen is soft.      Tenderness: There is no abdominal tenderness. There is no guarding.      Hernia: No hernia is present.   Musculoskeletal:         General: Tenderness present. No deformity. Normal range of motion.      Cervical back: Neck supple. Tenderness present.      Comments: Broadly and diffusely tender throughout the bilateral upper back soft tissues.   Lymphadenopathy:      Cervical: No cervical adenopathy.   Skin:     General: Skin is warm and dry.      Coloration: Skin is not jaundiced.      Findings: No erythema or rash.   Neurological:      General: No focal deficit present.      Mental Status: She is alert and oriented to person, place, and time. Mental status is at baseline.   Psychiatric:         Behavior: Behavior normal.         Thought Content: Thought content normal.         Judgment: Judgment normal.       EKG           EKG time/Interp time: 1320/1323  Rhythm/Rate: Sinus rhythm, 76 bpm  P waves and NE: P waves are present, 163 ms  QRS, axis: 89 ms, normal axis  ST and T waves: No ST segment elevations evident    Independently interpreted by me contemporaneously with treatment      Results for orders placed or performed during the hospital encounter of 02/18/22   COVID-19 and FLU A/B PCR - Swab, Nasopharynx    Specimen: Nasopharynx; Swab   Result Value Ref Range    COVID19 Not Detected Not Detected - Ref. Range    Influenza A PCR Not  Detected Not Detected    Influenza B PCR Not Detected Not Detected   Comprehensive Metabolic Panel    Specimen: Blood   Result Value Ref Range    Glucose 144 (H) 65 - 99 mg/dL    BUN 28 (H) 8 - 23 mg/dL    Creatinine 1.91 (H) 0.57 - 1.00 mg/dL    Sodium 130 (L) 136 - 145 mmol/L    Potassium 4.1 3.5 - 5.2 mmol/L    Chloride 93 (L) 98 - 107 mmol/L    CO2 24.0 22.0 - 29.0 mmol/L    Calcium 9.6 8.6 - 10.5 mg/dL    Total Protein 7.9 6.0 - 8.5 g/dL    Albumin 3.90 3.50 - 5.20 g/dL    ALT (SGPT) 18 1 - 33 U/L    AST (SGOT) 34 (H) 1 - 32 U/L    Alkaline Phosphatase 141 (H) 39 - 117 U/L    Total Bilirubin 1.1 0.0 - 1.2 mg/dL    eGFR Non African Amer 25 (L) >60 mL/min/1.73    Globulin 4.0 gm/dL    A/G Ratio 1.0 g/dL    BUN/Creatinine Ratio 14.7 7.0 - 25.0    Anion Gap 13.0 5.0 - 15.0 mmol/L   Troponin    Specimen: Blood   Result Value Ref Range    Troponin T <0.010 0.000 - 0.030 ng/mL   D-dimer, Quantitative    Specimen: Blood   Result Value Ref Range    D-Dimer, Quantitative 6.66 (H) 0.00 - 0.46 MCGFEU/mL   CBC Auto Differential    Specimen: Blood   Result Value Ref Range    WBC 13.99 (H) 3.40 - 10.80 10*3/mm3    RBC 3.87 3.77 - 5.28 10*6/mm3    Hemoglobin 11.8 (L) 12.0 - 15.9 g/dL    Hematocrit 37.1 34.0 - 46.6 %    MCV 95.9 79.0 - 97.0 fL    MCH 30.5 26.6 - 33.0 pg    MCHC 31.8 31.5 - 35.7 g/dL    RDW 13.6 12.3 - 15.4 %    RDW-SD 48.0 37.0 - 54.0 fl    MPV 12.0 6.0 - 12.0 fL    Platelets 180 140 - 450 10*3/mm3    Neutrophil % 82.1 (H) 42.7 - 76.0 %    Lymphocyte % 7.6 (L) 19.6 - 45.3 %    Monocyte % 9.1 5.0 - 12.0 %    Eosinophil % 0.4 0.3 - 6.2 %    Basophil % 0.2 0.0 - 1.5 %    Immature Grans % 0.6 (H) 0.0 - 0.5 %    Neutrophils, Absolute 11.49 (H) 1.70 - 7.00 10*3/mm3    Lymphocytes, Absolute 1.06 0.70 - 3.10 10*3/mm3    Monocytes, Absolute 1.27 (H) 0.10 - 0.90 10*3/mm3    Eosinophils, Absolute 0.06 0.00 - 0.40 10*3/mm3    Basophils, Absolute 0.03 0.00 - 0.20 10*3/mm3    Immature Grans, Absolute 0.08 (H) 0.00 - 0.05  10*3/mm3    nRBC 0.0 0.0 - 0.2 /100 WBC   Protime-INR    Specimen: Blood   Result Value Ref Range    Protime 13.9 12.1 - 15.0 Seconds    INR 1.05 0.90 - 1.10   aPTT    Specimen: Blood   Result Value Ref Range    PTT 37.6 24.3 - 38.1 seconds   Urinalysis With Microscopic If Indicated (No Culture) - Urine, Clean Catch    Specimen: Urine, Clean Catch   Result Value Ref Range    Color, UA Dark Yellow (A) Yellow, Straw    Appearance, UA Clear Clear    pH, UA <=5.0 4.5 - 8.0    Specific Gravity, UA 1.025 1.003 - 1.030    Glucose, UA Negative Negative    Ketones, UA Negative Negative    Bilirubin, UA Small (1+) (A) Negative    Blood, UA Trace (A) Negative    Protein, UA 30 mg/dL (1+) (A) Negative    Leuk Esterase, UA Small (1+) (A) Negative    Nitrite, UA Negative Negative    Urobilinogen, UA 1.0 E.U./dL 0.2 - 1.0 E.U./dL   Urinalysis, Microscopic Only - Urine, Clean Catch    Specimen: Urine, Clean Catch   Result Value Ref Range    RBC, UA 3-5 (A) None Seen /HPF    WBC, UA 13-20 (A) None Seen /HPF    Bacteria, UA 2+ (A) None Seen /HPF    Squamous Epithelial Cells, UA 7-12 (A) None Seen, 0-2 /HPF    Transitional Epithelial Cells, UA 3-6 (A) 0 - 2 /HPF    Hyaline Casts, UA 3-6 None Seen /LPF    Methodology Manual Light Microscopy    TSH    Specimen: Blood   Result Value Ref Range    TSH 0.540 0.270 - 4.200 uIU/mL   ECG 12 Lead   Result Value Ref Range    QT Interval 398 ms   Type & Screen    Specimen: Blood   Result Value Ref Range    ABO Type B     RH type Positive     Antibody Screen Negative     T&S Expiration Date 2/21/2022 11:59:59 PM    ABO RH Specimen Verification    Specimen: Blood   Result Value Ref Range    ABO Type B     RH type Positive      RADIOLOGY        Study: CT cervical spine without contrast    Findings: Intact fusion at C5-6. Multilevel degenerative changes as described above level by level. No acute findings.    Interpreted contemporaneously with treatment by Dr. Mena, independently viewed by  me    RADIOLOGY        Study: CT chest without contrast    Findings: 1. There is linear/groundglass change in the posterior right upper lobe and left upper lobe which is nonspecific and could represent atelectasis or sequela of previous infection.     2. There are linear dependent densities at both bases most consistent with chronic scar or atelectasis.     3. No evidence of acute edema or pneumonia.     4. The presence of pulmonary embolism cannot be excluded without contrast. The aorta is normal in caliber. There are extensive atherosclerotic calcification in the aorta and coronary arteries.    Interpreted contemporaneously with treatment by Dr. Tong, independently viewed by me    RADIOLOGY        Study: VQ scan lungs    Findings: 1. No perfusion abnormality is demonstrated to suggest pulmonary  embolism.  2. Perfusion-only imaging.    Interpreted contemporaneously with treatment by Dr. Oliveros, independently viewed by me    Procedures           ED Course  ED Course as of 02/18/22 2348 Fri Feb 18, 2022   1616 I reviewed the labs and the radiology reports from today's visit.  Initially, patient was hypoxic here but I think that may have been from taking the hydrocodone tablet at home.  I cannot find any specific reason on her CT scan or VQ scan to explain her hypoxia.  And furthermore, the hypoxia seems to have resolved now.  I took her off nasal cannula oxygen and she seemed to be handling room air with normal saturations.  She was also feeling a little better after some IV Toradol.  I note that her creatinine has increased up to 1.9.  She already has known chronic kidney disease and follows with a nephrologist.  I gave her some IV fluids which hopefully will help with that bump today.  Finally, her CT scan shows multilevel degenerative changes.  I will prescribe for her a muscle relaxer and a steroid pack to take this week.  I encouraged her to follow-up with spine surgery specialist at [facility]  "where she is already established in the past for problems in her lower back.  I reviewed with her and her daughter the usual \"return to ER\" instructions for any worsening signs or symptoms.  Then she was discharged home in good condition [ZAC]   7797 Plans were made to discharge the patient, however the nurse informed me that the patient's blood pressure was repeatedly low and so I went to go check on the patient myself.  We placed her in Trendelenburg for several minutes.  I found that her right arm blood pressures were reliably normotensive with systolic readings around 110-115.  However repeatedly her left arm blood pressures were in the 80s systolic range.  Patient still does not have any chest pain.  She reports that the pain in her neck is better although not gone.  I am paging cardiothoracic surgery specialist on call to discuss this case with them.  Ideally, I would like to get a CT angiogram of the aorta to further assess for a vascular emergency here, but her GFR is rather low and I will need to explore if there is any other alternative diagnostic strategy.  In the meantime, I am starting some IV fluids again for her blood pressure. [ZAC]   1740 Also note that her oxygen levels have been intermittently hypoxic as well.  We turned her off of oxygen for a while and she seemed to maintain her saturations appropriately, then she trended into the 80% range.  I am unclear about what is the source of that finding as well. [ZAC]   6243 Unfortunately, the cardiothoracic surgeon is unable to take my call right now because he is in surgery.  Therefore I must conclude that the best diagnostic solution is to request CT angiogram of the aorta and neck vessels for further clarification of her vasculature.  We will continue IV fluids to help her kidneys.  I am going to turn patient care over to Dr. Blount at this time and he will follow up on the CT results and assist with disposition plan as appropriate. [ZAC]   1808 " Discussed with Dr. Arturo Michael-radiology.  CT angiogram and CT neck would require 2 contrast boluses.  As patient has known renal dysfunction we do not think patient would handle to contrast bolus as well.  Thus canceling CT of neck.  Dr. Michael recommends CT aorta which will allow some visualization of the carotid arteries. [SS]   2012 CT shows PEs in the left lower lobe. Patient also has high-grade stenosis of the left subclavian artery. This appears secondary to atherosclerotic disease. Patient is oxygen requiring. Thus she will require admission. Giving first dose of Eliquis in the ED. Calling hospitalist.    Patient is neck is supple.  No meningismus. [SS]   2035 Discussed with Dr. Girard.  He will admit patient for further care. [SS]   2124 Urine specimen is contaminated.  Small leukocyte, nitrite negative.  Patient denies any dysuria or change in urination pattern. [SS]      ED Course User Index  [ZAC] Serjio Pimentel MD  [SS] Patric Blount MD                                                 MDM  Number of Diagnoses or Management Options     Amount and/or Complexity of Data Reviewed  Clinical lab tests: reviewed and ordered  Tests in the radiology section of CPT®: ordered and reviewed  Tests in the medicine section of CPT®: reviewed  Decide to obtain previous medical records or to obtain history from someone other than the patient: yes  Review and summarize past medical records: yes  Independent visualization of images, tracings, or specimens: yes    Risk of Complications, Morbidity, and/or Mortality  Presenting problems: moderate  Diagnostic procedures: moderate  Management options: moderate        Final diagnoses:   Multiple subsegmental pulmonary emboli without acute cor pulmonale (HCC)   Hypoxia   Subclavian artery stenosis, left (HCC)   Neck pain       ED Disposition  ED Disposition     ED Disposition Condition Comment    Decision to Admit  Level of Care: Telemetry [5]   Admitting Physician:  ALLAN JOINER [[id number]]   Attending Physician: ALLAN JOINER [[id number]]   Patient Class: Inpatient [101]            [facility]  [address] [address] [phone]  Schedule an appointment as soon as possible for a visit in 3 days  Call on Monday to schedule a follow-up appointment if not feeling better         Medication List      New Prescriptions    methocarbamol 500 MG tablet  Commonly known as: ROBAXIN  Take 1 tablet by mouth 2 (Two) Times a Day As Needed for Muscle Spasms for up to 7 days.     methylPREDNISolone 4 MG dose pack  Commonly known as: MEDROL  Take as directed on package instructions.        Stop    cefdinir 300 MG capsule  Commonly known as: OMNICEF           Where to Get Your Medications      These medications were sent to [facility]/pharmacy #[id number] - [place], [place] - [address] [phone]  - [phone]   [address] [facility] [address]    Phone: [phone]   · methocarbamol 500 MG tablet  · methylPREDNISolone 4 MG dose pack          Serjio Pimentel MD  02/18/22 6473       Serjio Pimentel MD  02/18/22 1743       Serjio Pimentel MD  02/18/22 7910       Patric Blount MD  02/18/22 9895

## 2022-02-18 NOTE — ED NOTES
Pt complaining of bilateral neck pain since Tuesday. Pt stated that the pain has not improved even after taking one of her husbands 7.5 mg hydrocodone early this AM. Pt reports increased pain with palpation to bilateral neck. Pt unable to turn her neck without pain. Pt denies SOB but pts O2 was 84% on RA during triage. Pt placed on 2L NC.      Russel Smiley, RN  02/18/22 2827       Russel Smiley, RN  02/18/22 0957

## 2022-02-18 NOTE — ED NOTES
Pt with c/o 3 day history of severe bilateral posterior  Neck pain. Entire posterior neck tender to touch. Pt denies arm pain but c/o numbness/tingling to bilateral arms. Strength equal in both arms. Pt states that she is unable to turn head from side to side due to pain     Kenyatta Blount, DIAMOND  02/18/22 4176

## 2022-02-18 NOTE — ED NOTES
Oxygen removed by Dr. Pimentel to see room air oxygen level     Kenyatta Blount RN  02/18/22 6693

## 2022-02-18 NOTE — DISCHARGE INSTRUCTIONS
Rest as needed and apply heating pad to affected area as we discussed.  Please return to the emergency room for any worsening pain, weakness, numbness or any other concerns.

## 2022-02-18 NOTE — ED NOTES
Dr. Pimentel informed of low BP. BP re-checked 3 times and consistently in the 80's. Orders received to place the patient in trendelenburg     Kenyatta Blount RN  02/18/22 4135

## 2022-02-19 ENCOUNTER — APPOINTMENT (OUTPATIENT)
Dept: ULTRASOUND IMAGING | Facility: HOSPITAL | Age: 77
End: 2022-02-19

## 2022-02-19 VITALS
SYSTOLIC BLOOD PRESSURE: 118 MMHG | BODY MASS INDEX: 29.63 KG/M2 | WEIGHT: 161 LBS | OXYGEN SATURATION: 94 % | TEMPERATURE: 96.3 F | RESPIRATION RATE: 16 BRPM | HEIGHT: 62 IN | HEART RATE: 69 BPM | DIASTOLIC BLOOD PRESSURE: 62 MMHG

## 2022-02-19 LAB
ANION GAP SERPL CALCULATED.3IONS-SCNC: 9.5 MMOL/L (ref 5–15)
BUN SERPL-MCNC: 30 MG/DL (ref 8–23)
BUN/CREAT SERPL: 19.4 (ref 7–25)
CALCIUM SPEC-SCNC: 8.6 MG/DL (ref 8.6–10.5)
CHLORIDE SERPL-SCNC: 103 MMOL/L (ref 98–107)
CO2 SERPL-SCNC: 24.5 MMOL/L (ref 22–29)
CREAT SERPL-MCNC: 1.55 MG/DL (ref 0.57–1)
DEPRECATED RDW RBC AUTO: 48.5 FL (ref 37–54)
ERYTHROCYTE [DISTWIDTH] IN BLOOD BY AUTOMATED COUNT: 13.6 % (ref 12.3–15.4)
GFR SERPL CREATININE-BSD FRML MDRD: 32 ML/MIN/1.73
GLUCOSE SERPL-MCNC: 109 MG/DL (ref 65–99)
HCT VFR BLD AUTO: 31.1 % (ref 34–46.6)
HGB BLD-MCNC: 9.8 G/DL (ref 12–15.9)
MCH RBC QN AUTO: 30.7 PG (ref 26.6–33)
MCHC RBC AUTO-ENTMCNC: 31.5 G/DL (ref 31.5–35.7)
MCV RBC AUTO: 97.5 FL (ref 79–97)
PLATELET # BLD AUTO: 148 10*3/MM3 (ref 140–450)
PMV BLD AUTO: 12.5 FL (ref 6–12)
POTASSIUM SERPL-SCNC: 3.5 MMOL/L (ref 3.5–5.2)
RBC # BLD AUTO: 3.19 10*6/MM3 (ref 3.77–5.28)
SODIUM SERPL-SCNC: 137 MMOL/L (ref 136–145)
WBC NRBC COR # BLD: 8.12 10*3/MM3 (ref 3.4–10.8)

## 2022-02-19 PROCEDURE — G0378 HOSPITAL OBSERVATION PER HR: HCPCS

## 2022-02-19 PROCEDURE — 85027 COMPLETE CBC AUTOMATED: CPT | Performed by: FAMILY MEDICINE

## 2022-02-19 PROCEDURE — 80048 BASIC METABOLIC PNL TOTAL CA: CPT | Performed by: FAMILY MEDICINE

## 2022-02-19 PROCEDURE — 93970 EXTREMITY STUDY: CPT

## 2022-02-19 PROCEDURE — 96361 HYDRATE IV INFUSION ADD-ON: CPT

## 2022-02-19 PROCEDURE — 99217 PR OBSERVATION CARE DISCHARGE MANAGEMENT: CPT | Performed by: INTERNAL MEDICINE

## 2022-02-19 RX ORDER — POTASSIUM CHLORIDE 20 MEQ/1
20 TABLET, EXTENDED RELEASE ORAL DAILY
Status: DISCONTINUED | OUTPATIENT
Start: 2022-02-19 | End: 2022-02-19 | Stop reason: HOSPADM

## 2022-02-19 RX ADMIN — CITALOPRAM HYDROBROMIDE 20 MG: 20 TABLET ORAL at 08:28

## 2022-02-19 RX ADMIN — ACETAMINOPHEN 650 MG: 325 TABLET ORAL at 04:07

## 2022-02-19 RX ADMIN — LEVOTHYROXINE SODIUM 88 MCG: 88 TABLET ORAL at 05:39

## 2022-02-19 RX ADMIN — SODIUM CHLORIDE 100 ML/HR: 9 INJECTION, SOLUTION INTRAVENOUS at 08:33

## 2022-02-19 RX ADMIN — POTASSIUM CHLORIDE 20 MEQ: 1500 TABLET, EXTENDED RELEASE ORAL at 12:31

## 2022-02-19 RX ADMIN — BACLOFEN 10 MG: 10 TABLET ORAL at 08:28

## 2022-02-19 RX ADMIN — METOPROLOL SUCCINATE 50 MG: 50 TABLET, EXTENDED RELEASE ORAL at 08:28

## 2022-02-19 RX ADMIN — FAMOTIDINE 40 MG: 20 TABLET ORAL at 08:28

## 2022-02-19 RX ADMIN — APIXABAN 10 MG: 2.5 TABLET, FILM COATED ORAL at 08:28

## 2022-02-19 RX ADMIN — TRAMADOL HYDROCHLORIDE 50 MG: 50 TABLET, COATED ORAL at 12:35

## 2022-02-19 NOTE — ED PROVIDER NOTES
Subjective   History of Present Illness    Review of Systems    Past Medical History:   Diagnosis Date   • Deep vein thrombosis (HCC)    • Functional diarrhea 2020   • History of total knee arthroplasty    • Hyperlipidemia    • Hypertension    • Hypothyroidism        Allergies   Allergen Reactions   • Codeine Delirium   • Sulfa Antibiotics Other (See Comments)     Pt had joint pain       Past Surgical History:   Procedure Laterality Date   • COLONOSCOPY     • HYSTERECTOMY     • REPLACEMENT TOTAL KNEE Left    • REPLACEMENT TOTAL KNEE Right    • TONSILLECTOMY     • TOTAL HIP ARTHROPLASTY Left        Family History   Problem Relation Age of Onset   • Hypertension Father    • Breast cancer Neg Hx    • Colon cancer Neg Hx    • Colon polyps Neg Hx        Social History     Socioeconomic History   • Marital status:    Tobacco Use   • Smoking status: Former Smoker     Types: Cigarettes     Quit date:      Years since quittin.1   • Smokeless tobacco: Never Used   • Tobacco comment: caffeine use: 1 cup daily   Substance and Sexual Activity   • Alcohol use: Not Currently   • Drug use: Never           Objective   Physical Exam    Procedures           ED Course  ED Course as of 22   1616 I reviewed the labs and the radiology reports from today's visit.  Initially, patient was hypoxic here but I think that may have been from taking the hydrocodone tablet at home.  I cannot find any specific reason on her CT scan or VQ scan to explain her hypoxia.  And furthermore, the hypoxia seems to have resolved now.  I took her off nasal cannula oxygen and she seemed to be handling room air with normal saturations.  She was also feeling a little better after some IV Toradol.  I note that her creatinine has increased up to 1.9.  She already has known chronic kidney disease and follows with a nephrologist.  I gave her some IV fluids which hopefully will help with that bump today.  Finally, her  "CT scan shows multilevel degenerative changes.  I will prescribe for her a muscle relaxer and a steroid pack to take this week.  I encouraged her to follow-up with spine surgery specialist at [facility] where she is already established in the past for problems in her lower back.  I reviewed with her and her daughter the usual \"return to ER\" instructions for any worsening signs or symptoms.  Then she was discharged home in good condition [ZAC]   5190 Plans were made to discharge the patient, however the nurse informed me that the patient's blood pressure was repeatedly low and so I went to go check on the patient myself.  We placed her in Trendelenburg for several minutes.  I found that her right arm blood pressures were reliably normotensive with systolic readings around 110-115.  However repeatedly her left arm blood pressures were in the 80s systolic range.  Patient still does not have any chest pain.  She reports that the pain in her neck is better although not gone.  I am paging cardiothoracic surgery specialist on call to discuss this case with them.  Ideally, I would like to get a CT angiogram of the aorta to further assess for a vascular emergency here, but her GFR is rather low and I will need to explore if there is any other alternative diagnostic strategy.  In the meantime, I am starting some IV fluids again for her blood pressure. [ZAC]   1740 Also note that her oxygen levels have been intermittently hypoxic as well.  We turned her off of oxygen for a while and she seemed to maintain her saturations appropriately, then she trended into the 80% range.  I am unclear about what is the source of that finding as well. [ZAC]   0777 Unfortunately, the cardiothoracic surgeon is unable to take my call right now because he is in surgery.  Therefore I must conclude that the best diagnostic solution is to request CT angiogram of the aorta and neck vessels for further clarification of her vasculature.  We will " continue IV fluids to help her kidneys.  I am going to turn patient care over to Dr. Blount at this time and he will follow up on the CT results and assist with disposition plan as appropriate. [ZAC]   1808 Discussed with Dr. Arturo Michael-radiology.  CT angiogram and CT neck would require 2 contrast boluses.  As patient has known renal dysfunction we do not think patient would handle to contrast bolus as well.  Thus canceling CT of neck.  Dr. Michael recommends CT aorta which will allow some visualization of the carotid arteries. [SS]   2012 CT shows PEs in the left lower lobe. Patient also has high-grade stenosis of the left subclavian artery. This appears secondary to atherosclerotic disease. Patient is oxygen requiring. Thus she will require admission. Giving first dose of Eliquis in the ED. Calling hospitalist.    Patient is neck is supple.  No meningismus. [SS]   2035 Discussed with Dr. Girard.  He will admit patient for further care. [SS]   2124 Urine specimen is contaminated.  Small leukocyte, nitrite negative.  Patient denies any dysuria or change in urination pattern. [SS]      ED Course User Index  [ZAC] Serjio Pimentel MD  [SS] Patric Blount MD      Labs Reviewed   COMPREHENSIVE METABOLIC PANEL - Abnormal; Notable for the following components:       Result Value    Glucose 144 (*)     BUN 28 (*)     Creatinine 1.91 (*)     Sodium 130 (*)     Chloride 93 (*)     AST (SGOT) 34 (*)     Alkaline Phosphatase 141 (*)     eGFR Non  Amer 25 (*)     All other components within normal limits    Narrative:     GFR Normal >60  Chronic Kidney Disease <60  Kidney Failure <15     D-DIMER, QUANTITATIVE - Abnormal; Notable for the following components:    D-Dimer, Quantitative 6.66 (*)     All other components within normal limits    Narrative:     Can be elevated in, but is not diagnostic for deep vein thrombosis (DVT) or pulmonary embolis (PE).  It is also elevated in other medical conditions.  Clinical  correlation is required.  The negative cut-off value for the D-Dimer is 0.50 mcg FEU/mL for DVT and PE.     CBC WITH AUTO DIFFERENTIAL - Abnormal; Notable for the following components:    WBC 13.99 (*)     Hemoglobin 11.8 (*)     Neutrophil % 82.1 (*)     Lymphocyte % 7.6 (*)     Immature Grans % 0.6 (*)     Neutrophils, Absolute 11.49 (*)     Monocytes, Absolute 1.27 (*)     Immature Grans, Absolute 0.08 (*)     All other components within normal limits   URINALYSIS W/ MICROSCOPIC IF INDICATED (NO CULTURE) - Abnormal; Notable for the following components:    Color, UA Dark Yellow (*)     Bilirubin, UA Small (1+) (*)     Blood, UA Trace (*)     Protein, UA 30 mg/dL (1+) (*)     Leuk Esterase, UA Small (1+) (*)     All other components within normal limits   URINALYSIS, MICROSCOPIC ONLY - Abnormal; Notable for the following components:    RBC, UA 3-5 (*)     WBC, UA 13-20 (*)     Bacteria, UA 2+ (*)     Squamous Epithelial Cells, UA 7-12 (*)     Transitional Epithelial Cells, UA 3-6 (*)     All other components within normal limits   COVID-19 AND FLU A/B, NP SWAB IN TRANSPORT MEDIA 8-12 HR TAT - Normal    Narrative:     Fact sheet for providers: https://www.fda.gov/media/268374/download    Fact sheet for patients: https://www.fda.gov/media/576144/download    Test performed by PCR.   TROPONIN (IN-HOUSE) - Normal    Narrative:     Troponin T Reference Range:  <= 0.03 ng/mL-   Negative for AMI  >0.03 ng/mL-     Abnormal for myocardial necrosis.  Clinicians would have to utilize clinical acumen, EKG, Troponin and serial changes to determine if it is an Acute Myocardial Infarction or myocardial injury due to an underlying chronic condition.       Results may be falsely decreased if patient taking Biotin.     PROTIME-INR - Normal    Narrative:     Therapeutic Ranges for INR: 2.0-3.0 (PT 20-30)                              2.5-3.5 (PT 25-34)   APTT - Normal    Narrative:     PTT = The equivalent PTT values for the  therapeutic range of heparin levels at 0.1 to 0.7 U/ml are 53 to 110 seconds.     TYPE AND SCREEN   ABORH 2ND SPECIMEN VERIFICATION   CBC AND DIFFERENTIAL    Narrative:     The following orders were created for panel order CBC & Differential.  Procedure                               Abnormality         Status                     ---------                               -----------         ------                     CBC Auto Differential[[id number]]        Abnormal            Final result                 Please view results for these tests on the individual orders.     CT Chest Without Contrast Diagnostic    Result Date: 2/18/2022  Narrative: CT Chest WO INDICATION: 76-year-old emergency department patient with hypoxia. Low oxygen saturation levels in emergency department today TECHNIQUE: CT of the thorax without IV contrast. Coronal and sagittal reconstructions were obtained.  Radiation dose reduction techniques included automated exposure control or exposure modulation based on body size. Count of known CT and cardiac nuc med studies performed in previous 12 months: 0. COMPARISON: None available. FINDINGS: Images through the thoracic inlet are normal. Images of the chest demonstrate atherosclerotic change of the aorta and coronary arteries. The ascending aorta measures 3.2 cm which is within normal limits. Cardiac chambers, pericardium and esophagus are normal. Lung window images demonstrate linear reticular/ground glass change in the right upper lobe on image 26 series 4 in the posterior left upper lobe best seen on image 37 series 4. These changes are nonspecific. There is dependent density of both lung bases consistent with chronic atelectasis or scar. There is no definite acute pneumonia or edema. There are no effusions. Limited views through the upper abdomen demonstrate benign calcified granulomatous changes in the liver and spleen. No adrenal lesion. Bone window images demonstrate multilevel degenerative  change with mild underlying scoliosis of the lower thoracic spine. No acute fracture.     Impression: 1. There is linear/groundglass change in the posterior right upper lobe and left upper lobe which is nonspecific and could represent atelectasis or sequela of previous infection. 2. There are linear dependent densities at both bases most consistent with chronic scar or atelectasis. 3. No evidence of acute edema or pneumonia. 4. The presence of pulmonary embolism cannot be excluded without contrast. The aorta is normal in caliber. There are extensive atherosclerotic calcification in the aorta and coronary arteries. Signer Name: Josette Tong MD  Signed: 2/18/2022 3:18 PM  Workstation Name: [id number]  [facility]    CT Cervical Spine Without Contrast    Result Date: 2/18/2022  Narrative: CT Spine Cervical WO INDICATION: Sharp neck pain for the past 3 days radiating to both upper extremities, worse on the right TECHNIQUE: CT of the cervical spine without IV contrast. Coronal and sagittal reconstructions were obtained.  Radiation dose reduction techniques included automated exposure control or exposure modulation based on body size. Count of known CT and cardiac nuc med studies performed in previous 12 months: 0. COMPARISON: None available. FINDINGS: There is advanced degenerative change at the articulation of the odontoid with the anterior arch of C1 accompanied by thickening and calcification of the transverse ligament. This causes moderate narrowing just below the foramen magnum but no cord compression. At C2-3 the disc is unremarkable. Facet arthropathy is moderate bilaterally. The right foramen is mildly narrowed. At C3-4 there is moderate bilateral facet arthropathy. The disc is narrowed and there is a grade 1 anterolisthesis of approximately 3 mm. Foraminal narrowing is mild on the left and moderate on the right. At C4-5 there is disc space narrowing with a degenerative grade 1  anterolisthesis of approximately 3 mm. Posterior disc bulging with osteophyte is noted to a mild degree. Facet arthropathy is moderately severe bilaterally. Central stenosis is mild. Foraminal narrowing is mild on the left and moderate on the right. Postoperative changes of anterior fusion are seen across C5-C6. There is no evidence of pseudoarthrosis. Foraminal narrowing is mild bilaterally. The facet joints are ankylosed. At C6-7 there is advanced degenerative disc disease with disc space collapse and reactive endplate changes. Posterior osteophyte formation is seen to a mild degree extending to the uncovertebral joints. Foraminal narrowing is mild bilaterally. At C7-T1 the disc is unremarkable. There is moderate facet arthropathy on the right. The right foramen is mildly narrowed. No fractures are seen. No destructive bone lesions are noted. Paraspinous soft tissues are unremarkable. IMPRESSION: Intact fusion at C5-6. Multilevel degenerative changes as described above level by level. No acute findings. Signer Name: Harpreet Harris MD  Signed: 2/18/2022 3:21 PM  Workstation Name: [id number]  [facility] Lung Scan Perfusion Particulate    Result Date: 2/18/2022  Narrative: RADIONUCLIDE PERFUSION LUNG SCAN, 02/18/2022  HISTORY: 76-year-old female with hypoxia, elevated d-dimer.  DOSE: 5.9 mCi technetium labeled MAA intravenously.  NOTE: Ventilation lung imaging is currently not allowed under COVID-19 restrictions on aerosol generating procedures.  COMPARISON: Noncontrast CT chest today.  FINDINGS: There is uniform distribution of perfusion activity throughout both lungs. No significant focal or multifocal perfusion abnormality is demonstrated to suggest pulmonary embolism.      Impression: 1. No perfusion abnormality is demonstrated to suggest pulmonary embolism. 2. Perfusion-only imaging.  This report was finalized on 2/18/2022 4:03 PM by Dr. Miguel Oliveros MD.      CT Angiogram  Aorta    Result Date: 2/18/2022  Narrative: CT ANGIOGRAM AORTA INDICATION: Unequal blood pressures in the upper extremities. Low blood pressure in the left arm. Neck pain and stiffness for 3 days. TECHNIQUE: CT angiogram of the chest with IV contrast. 3-D reconstructions were obtained and reviewed.   Radiation dose reduction techniques included automated exposure control or exposure modulation based on body size. Count of known CT and cardiac nuc med studies performed in previous 12 months: 0. COMPARISON: CT chest without contrast 2/18/2022 FINDINGS: Adequate opacification of the pulmonary arteries with subsegmental filling defects in the left lower lobe, consistent with acute pulmonary emboli. Questionable small subsegmental filling defect in the right upper lobe pulmonary artery. Thoracic aorta normal in course and caliber without dissection. High-grade stenosis at the left subclavian artery origin. Remaining great vessel origins are widely patent. Heart size is normal. No pericardial effusion. No pleural effusion. No pneumothorax. Bibasilar dependent subsegmental atelectasis. Linear groundglass opacities in the bilateral lung apices which may represent atelectasis or sequelae of previous infection. Included upper abdomen demonstrates cholelithiasis. No acute osseous abnormality.     Impression: 1. Acute pulmonary emboli in the left lower lobe subsegmental pulmonary arteries. Questionable small subsegmental filling defects in the right upper lobe pulmonary artery. No CT evidence of acute right heart strain. 2. Negative for thoracic aortic aneurysm/dissection. 3. High-grade stenosis at the left subclavian artery origin. 4. Bibasilar subsegmental dependent atelectasis. Linear groundglass opacities in the bilateral lung apices may represent atelectasis or sequelae of previous infection. 5. Cholelithiasis. NOTIFICATION: Critical Value/emergent results were called by telephone at the time of interpretation on 2/18/2022  6:52 PM to the emergency room physician, Dr. Elmo Blount, who verbally acknowledged these results. Signer Name: Farhan Michael MD  Signed: 2/18/2022 6:56 PM  Workstation Name: [id number] [facility]  [facility] [place]         My differential diagnosis for dyspnea includes but is not limited to:  Asthma, COPD, COVID-19, pneumonia, pulmonary embolus, acute respiratory distress syndrome, RSV, pneumothorax, pleural effusion, pulmonary fibrosis, congestive heart failure, myocardial infarction, DKA, uremia, acidosis, sepsis, anemia, drug related, hyperventilation, CNS disease    My differential includes but is not limited to neck pain, cervical contusion, degenerative disc disease, herniated disc, acute cervical strain, cervical radiculopathy, cervical fracture, torticollis, carotid artery dissection and vertebral artery dissection                                        MDM    Final diagnoses:   Multiple subsegmental pulmonary emboli without acute cor pulmonale (HCC)   Hypoxia   Subclavian artery stenosis, left (HCC)   Neck pain       ED Disposition  ED Disposition     ED Disposition Condition Comment    Decision to Admit  Level of Care: Telemetry [5]   Admitting Physician: ALLAN JOINER [[id number]]   Attending Physician: ALLAN JOINER [[id number]]   Patient Class: Inpatient [101]            [facility] [address] [address] [phone]  Schedule an appointment as soon as possible for a visit in 3 days  Call on Monday to schedule a follow-up appointment if not feeling better         Medication List      New Prescriptions    methocarbamol 500 MG tablet  Commonly known as: ROBAXIN  Take 1 tablet by mouth 2 (Two) Times a Day As Needed for Muscle Spasms for up to 7 days.     methylPREDNISolone 4 MG dose pack  Commonly known as: MEDROL  Take as directed on package instructions.        Stop    cefdinir 300 MG capsule  Commonly known as: OMNICEF            Where to Get Your Medications      These medications were sent to [facility]/pharmacy #[id number] - [place], [place] - [address] - [phone]  - [phone]   [address], [place] [address]    Phone: [phone]   · methocarbamol 500 MG tablet  · methylPREDNISolone 4 MG dose pack          Patric Blount MD  02/18/22 2532

## 2022-02-19 NOTE — DISCHARGE SUMMARY
Date of Admission: 2/18/2022    Date of Discharge:  2/19/2022    Length of stay:  LOS: 1 day     Presenting Problem:   Neck pain [M54.2]  Hypoxia [R09.02]  Subclavian artery stenosis, left (HCC) [I77.1]  Multiple subsegmental pulmonary emboli without acute cor pulmonale (HCC) [I26.94]      Active Diagnosis During Hospital Stay/Discharge Diagnoses/Course by Diagnoses:    1.   Acute Left lower lobe subsegmental pulmonary emboli   -No evidence of right heart strain  -Was able to be placed on room air  -Home on Eliquis  -Lower extremity Dopplers negative for DVT  -Could be related to recent immobility, will need hypercoagulable work-up as outpatient     2.  Acute hypoxic respiratory failure   -Resolved was on room air and checked at several times during repeat examinations  -Could be related to PE above versus atelectasis     3.  Subclavian stenosis new diagnosis   -will need work-up as outpatient     4.  Hypertension controlled continue with metoprolol      5.  Chronic kidney disease stage III  -Baseline recently has been around 1.3  -1.5 at discharge even after getting IV contrast     6.  Cervical degenerative disc disease with severe neck pain possible neck muscle strain  -CT cervical spine reviewed no acute process  -Home on short steroid taper and as needed muscle relaxer    7.    Symptomatic bacteriuria  -No symptoms and UA specimen compared contaminated thus no antibiotics     8.  Leukocytosis likely due to demargination  -Resolved     9.  Hypothyroidism nothing acute continue with home thyroid supplementation check TSH     10.  Muscle joint osteoarthritis as needed Tylenol no NSAId's with history of chronic kidney disease      11.  Major depression anxiety stable continue with citalopram     12.  GERD continue with Pepcid     13.  Asymptomatic cholelithiasis discussed with patient       Active Hospital Problems    Diagnosis  POA   • Multiple subsegmental pulmonary emboli without acute cor pulmonale (HCC)  [I26.94]  Yes      Resolved Hospital Problems   No resolved problems to display.         Hospital Course  Patient is a 76 y.o. female presented with actually neck pain.  However incidentally was also found to have subsegmental pulmonary emboli and briefly was on oxygen.  Thus she was admitted however she was able to be titrated to room air the next day.  She will be placed on Eliquis.  Patient was requesting to be discharged home.  Since she was stable, she will be sent home on anticoagulation and will need further follow-up as an outpatient      Procedures Performed:as noted above         Consults:   Consults     No orders found for last 30 day(s).          Pertinent Test Results:     Results from last 7 days   Lab Units 02/19/22  0400 02/18/22  1308   WBC 10*3/mm3 8.12 13.99*   HEMOGLOBIN g/dL 9.8* 11.8*   HEMATOCRIT % 31.1* 37.1   PLATELETS 10*3/mm3 148 180     Results from last 7 days   Lab Units 02/19/22  0400 02/18/22  1308 02/17/22  1351   SODIUM mmol/L 137 130* 135*   POTASSIUM mmol/L 3.5 4.1 3.9   CHLORIDE mmol/L 103 93* 98   CO2 mmol/L 24.5 24.0 28.3   BUN mg/dL 30* 28* 17   CREATININE mg/dL 1.55* 1.91* 0.99   CALCIUM mg/dL 8.6 9.6 9.3   BILIRUBIN mg/dL  --  1.1  --    ALK PHOS U/L  --  141*  --    ALT (SGPT) U/L  --  18  --    AST (SGOT) U/L  --  34*  --    GLUCOSE mg/dL 109* 144* 123*       Microbiology Results (last 10 days)     Procedure Component Value - Date/Time    COVID-19 and FLU A/B PCR - Swab, Nasopharynx [[id number]]  (Normal) Collected: 02/18/22 1308    Lab Status: Final result Specimen: Swab from Nasopharynx Updated: 02/18/22 1336     COVID19 Not Detected     Influenza A PCR Not Detected     Influenza B PCR Not Detected    Narrative:      Fact sheet for providers: https://www.fda.gov/media/989818/download    Fact sheet for patients: https://www.fda.gov/media/546608/download    Test performed by PCR.              Imaging Results (All)     Procedure Component Value Units Date/Time    US Venous  Doppler Lower Extremity Bilateral (duplex) [[id number]] Collected: 02/19/22 1143     Updated: 02/19/22 1145    Narrative:      US Veins LE Duplex BILAT    HISTORY:   Pulmonary embolism. Evaluate for DVT.    TECHNIQUE:   Real-time ultrasound was performed of both lower extremities utilizing spectral and color Doppler with compression and augmentation techniques.    COMPARISON:  None available.    FINDINGS:  Right Lower Extremity:  There is no deep venous thrombus seen in the right lower extremity, including the right common femoral veins, right femoral veins and right popliteal veins.  Normal compressibility and respiratory phasicity was visualized.  No calf vein thrombus. Greater  saphenous vein is patent.    Left Lower Extremity:  There is no deep venous thrombus seen in the left lower extremity, including the left common femoral veins, left femoral veins and left popliteal veins.   Normal compressibility and respiratory phasicity was visualized.  No calf vein thrombus. Greater  saphenous vein is patent.        Impression:      No deep venous thrombus seen in either lower extremity.    Signer Name: Harpreet Harris MD   Signed: 2/19/2022 11:43 AM   Workstation Name: [id number]    [facility]    CT Angiogram Aorta [[id number]] Collected: 02/18/22 1856     Updated: 02/18/22 1858    Narrative:      CT ANGIOGRAM AORTA    INDICATION:   Unequal blood pressures in the upper extremities. Low blood pressure in the left arm. Neck pain and stiffness for 3 days.    TECHNIQUE:   CT angiogram of the chest with IV contrast. 3-D reconstructions were obtained and reviewed.   Radiation dose reduction techniques included automated exposure control or exposure modulation based on body size. Count of known CT and cardiac nuc med studies  performed in previous 12 months: 0.     COMPARISON:   CT chest without contrast 2/18/2022    FINDINGS:   Adequate opacification of the pulmonary arteries with subsegmental filling  defects in the left lower lobe, consistent with acute pulmonary emboli. Questionable small subsegmental filling defect in the right upper lobe pulmonary artery. Thoracic aorta  normal in course and caliber without dissection. High-grade stenosis at the left subclavian artery origin. Remaining great vessel origins are widely patent. Heart size is normal. No pericardial effusion.    No pleural effusion. No pneumothorax. Bibasilar dependent subsegmental atelectasis. Linear groundglass opacities in the bilateral lung apices which may represent atelectasis or sequelae of previous infection.    Included upper abdomen demonstrates cholelithiasis.    No acute osseous abnormality.      Impression:        1. Acute pulmonary emboli in the left lower lobe subsegmental pulmonary arteries. Questionable small subsegmental filling defects in the right upper lobe pulmonary artery. No CT evidence of acute right heart strain.  2. Negative for thoracic aortic aneurysm/dissection.  3. High-grade stenosis at the left subclavian artery origin.  4. Bibasilar subsegmental dependent atelectasis. Linear groundglass opacities in the bilateral lung apices may represent atelectasis or sequelae of previous infection.  5. Cholelithiasis.    NOTIFICATION: Critical Value/emergent results were called by telephone at the time of interpretation on 2/18/2022 6:52 PM to the emergency room physician, Dr. Elmo Blount, who verbally acknowledged these results.    Signer Name: Farhan Michael MD   Signed: 2/18/2022 6:56 PM   Workstation Name: [id number]    [facility] Lung Scan Perfusion Particulate [[id number]] Collected: 02/18/22 1601     Updated: 02/18/22 1605    Narrative:      RADIONUCLIDE PERFUSION LUNG SCAN, 02/18/2022     HISTORY:   76-year-old female with hypoxia, elevated d-dimer.     DOSE:   5.9 mCi technetium labeled MAA intravenously.     NOTE: Ventilation lung imaging is currently not allowed under  COVID-19  restrictions on aerosol generating procedures.     COMPARISON:   Noncontrast CT chest today.     FINDINGS:   There is uniform distribution of perfusion activity throughout both  lungs. No significant focal or multifocal perfusion abnormality is  demonstrated to suggest pulmonary embolism.       Impression:      1. No perfusion abnormality is demonstrated to suggest pulmonary  embolism.  2. Perfusion-only imaging.     This report was finalized on 2/18/2022 4:03 PM by Dr. Miguel Oliveros MD.       CT Cervical Spine Without Contrast [[id number]] Collected: 02/18/22 1521     Updated: 02/18/22 1523    Narrative:      CT Spine Cervical WO    INDICATION:   Sharp neck pain for the past 3 days radiating to both upper extremities, worse on the right    TECHNIQUE:   CT of the cervical spine without IV contrast. Coronal and sagittal reconstructions were obtained.  Radiation dose reduction techniques included automated exposure control or exposure modulation based on body size. Count of known CT and cardiac nuc med  studies performed in previous 12 months: 0.     COMPARISON:  None available.    FINDINGS:  There is advanced degenerative change at the articulation of the odontoid with the anterior arch of C1 accompanied by thickening and calcification of the transverse ligament. This causes moderate narrowing just below the foramen magnum but no cord  compression.    At C2-3 the disc is unremarkable. Facet arthropathy is moderate bilaterally. The right foramen is mildly narrowed.    At C3-4 there is moderate bilateral facet arthropathy. The disc is narrowed and there is a grade 1 anterolisthesis of approximately 3 mm. Foraminal narrowing is mild on the left and moderate on the right.    At C4-5 there is disc space narrowing with a degenerative grade 1 anterolisthesis of approximately 3 mm. Posterior disc bulging with osteophyte is noted to a mild degree. Facet arthropathy is moderately severe bilaterally. Central  stenosis is mild.  Foraminal narrowing is mild on the left and moderate on the right.    Postoperative changes of anterior fusion are seen across C5-C6. There is no evidence of pseudoarthrosis. Foraminal narrowing is mild bilaterally. The facet joints are ankylosed.    At C6-7 there is advanced degenerative disc disease with disc space collapse and reactive endplate changes. Posterior osteophyte formation is seen to a mild degree extending to the uncovertebral joints. Foraminal narrowing is mild bilaterally.    At C7-T1 the disc is unremarkable. There is moderate facet arthropathy on the right. The right foramen is mildly narrowed.    No fractures are seen. No destructive bone lesions are noted. Paraspinous soft tissues are unremarkable. IMPRESSION:  Intact fusion at C5-6. Multilevel degenerative changes as described above level by level. No acute findings.    Signer Name: Harpreet Harris MD   Signed: 2/18/2022 3:21 PM   Workstation Name: [id number]    [facility]    CT Chest Without Contrast Diagnostic [[id number]] Collected: 02/18/22 1518     Updated: 02/18/22 1520    Narrative:      CT Chest WO    INDICATION:   76-year-old emergency department patient with hypoxia. Low oxygen saturation levels in emergency department today    TECHNIQUE:   CT of the thorax without IV contrast. Coronal and sagittal reconstructions were obtained.  Radiation dose reduction techniques included automated exposure control or exposure modulation based on body size. Count of known CT and cardiac nuc med studies  performed in previous 12 months: 0.     COMPARISON:   None available.    FINDINGS:  Images through the thoracic inlet are normal.    Images of the chest demonstrate atherosclerotic change of the aorta and coronary arteries. The ascending aorta measures 3.2 cm which is within normal limits. Cardiac chambers, pericardium and esophagus are normal.    Lung window images demonstrate linear reticular/ground glass  change in the right upper lobe on image 26 series 4 in the posterior left upper lobe best seen on image 37 series 4. These changes are nonspecific. There is dependent density of both lung bases  consistent with chronic atelectasis or scar. There is no definite acute pneumonia or edema. There are no effusions.    Limited views through the upper abdomen demonstrate benign calcified granulomatous changes in the liver and spleen. No adrenal lesion.    Bone window images demonstrate multilevel degenerative change with mild underlying scoliosis of the lower thoracic spine. No acute fracture.      Impression:          1. There is linear/groundglass change in the posterior right upper lobe and left upper lobe which is nonspecific and could represent atelectasis or sequela of previous infection.    2. There are linear dependent densities at both bases most consistent with chronic scar or atelectasis.    3. No evidence of acute edema or pneumonia.    4. The presence of pulmonary embolism cannot be excluded without contrast. The aorta is normal in caliber. There are extensive atherosclerotic calcification in the aorta and coronary arteries.    Signer Name: Josette Tong MD   Signed: 2/18/2022 3:18 PM   Workstation Name: [id number]    [facility]            Condition on Discharge:  stable    Vital Signs  Temp:  [96.3 °F (35.7 °C)-98.1 °F (36.7 °C)] 96.3 °F (35.7 °C)  Heart Rate:  [67-86] 69  Resp:  [16-18] 16  BP: ()/(42-88) 118/62  Note during examination x2 on discharge oxygen saturation was checked on room air and were 95% and 96% respectively.    Physical Exam:  Physical Exam  Vitals reviewed.   Cardiovascular:      Rate and Rhythm: Normal rate.   Pulmonary:      Effort: Pulmonary effort is normal. No respiratory distress.   Abdominal:      General: There is no distension.   Neurological:      Mental Status: She is alert. Mental status is at baseline.   Psychiatric:         Mood and  Affect: Mood normal.         Discharge Disposition  Home or Self Care    Discharge Medications     Discharge Medications      New Medications      Instructions Start Date   apixaban 5 MG tablet tablet  Commonly known as: ELIQUIS   5 mg, Oral, 2 Times Daily, Take 2 tablets by mouth twice a day for the first 7 days      methocarbamol 500 MG tablet  Commonly known as: ROBAXIN   500 mg, Oral, 2 Times Daily PRN      methylPREDNISolone 4 MG dose pack  Commonly known as: MEDROL   Take as directed on package instructions.         Continue These Medications      Instructions Start Date   acetaminophen 650 MG 8 hr tablet  Commonly known as: TYLENOL   650 mg, Oral, Every 8 Hours PRN      citalopram 20 MG tablet  Commonly known as: CeleXA   20 mg, Oral, Daily      famotidine 40 MG tablet  Commonly known as: PEPCID   40 mg, Oral, Daily PRN      levothyroxine 88 MCG tablet  Commonly known as: SYNTHROID, LEVOTHROID   88 mcg, Oral, Daily      lovastatin 20 MG tablet  Commonly known as: MEVACOR   20 mg, Oral, Nightly      metoprolol succinate XL 50 MG 24 hr tablet  Commonly known as: TOPROL-XL   50 mg, Oral, 2 Times Daily      omeprazole 40 MG capsule  Commonly known as: priLOSEC   40 mg, Oral, Daily      traMADol 50 MG tablet  Commonly known as: ULTRAM   50 mg, Oral, Every 6 Hours PRN         Stop These Medications    cefdinir 300 MG capsule  Commonly known as: OMNICEF        ASK your doctor about these medications      Instructions Start Date   Vitamin D (Cholecalciferol) 10 MCG (400 UNIT) tablet  Commonly known as: CHOLECALCIFEROL   400 Units, Oral, Daily             Discharge Diet:   Diet Instructions     Diet: Regular, Cardiac      Discharge Diet:  Regular  Cardiac             Activity at Discharge:   Activity Instructions     Gradually Increase Activity Until at Pre-Hospitalization Level            Follow-up Appointments  Future Appointments   Date Time Provider Department Center   6/21/2022 11:00 AM Gonzalez Bautista MD [facility]  [facility]     Additional Instructions for the Follow-ups that You Need to Schedule     Discharge Follow-up with PCP   As directed       Currently Documented PCP:    Harpreet Alcantara MD    PCP Phone Number:    [phone]     Follow Up Details: one week               Test Results Pending at Discharge       Risk for Readmission (LACE) Score: 5 (2/19/2022  6:02 AM)      This patient has been examined wearing appropriate Personal Protective Equipment. 02/19/22          Electronically signed by Calin Alvares DO, 02/19/22, 12:51 EST.

## 2022-02-19 NOTE — CASE MANAGEMENT/SOCIAL WORK
Case Management Discharge Note      Final Note: Discharged home.    Provided Post Acute Provider List?: Refused  Refused Provider List Comment: offered community resources but patient declines the need for them at this time.    Selected Continued Care - Discharged on 2/19/2022 Admission date: 2/18/2022 - Discharge disposition: Home or Self Care    Destination    No services have been selected for the patient.              Durable Medical Equipment    No services have been selected for the patient.              Dialysis/Infusion    No services have been selected for the patient.              Home Medical Care    No services have been selected for the patient.              Therapy    No services have been selected for the patient.              Community Resources    No services have been selected for the patient.              Community & DME    No services have been selected for the patient.                       Final Discharge Disposition Code: 01 - home or self-care

## 2022-02-19 NOTE — NURSING NOTE
Patient given discharge instructions, including eliquis coupon provided by case management. Patient verbalized understanding of instructions, and stated no further questions at this time.

## 2022-02-19 NOTE — H&P
HISTORY AND PHYSICAL      Patient Care Team:  Harpreet Alcantara MD as PCP - General  Harpreet Alcantara MD as PCP - Family Medicine    CHIEF COMPLAINT: Neck pain    HISTORY OF PRESENT ILLNESS:  76-year-old white female presented emergency room with 4 to 5-day history of progressive worsening neck pain.  She developed crampy pressure-like neck pain in the posterior neck about 4 days ago progresses gotten worse and radiates to both shoulders and down her left arm there is no numbness or tingling.  Hurts to move her neck to either side look up or down.  Patient denies any recent trauma to her neck or any strain.  She has prior history of cervical disc disease and has had prior cervical disc surgery but has not had problems neck pain until recently.  The pain started radiating up to her head is down to her mid back and has gotten worse.    While the emergency room she was found to be significantly hypoxic with pulse ox in the mid 80s she denies any shortness of breath cough or chest congestion.  Denies any chest pain.  Her D-dimer is elevated and therefore she had a VQ scan which was unremarkable.  Also noted patient had significantly different blood pressures in the right as compared to the left arm which is significantly decreased.  Therefore she underwent a CT angiogram of the chest which showed subsegmental left lower lobe pulmonary emboli and possible right upper lobe pulmonary emboli.  Denies any calf pain or leg swelling.  He does admit to not being as mobile recently because of her neck pain.    Past Medical History:   Diagnosis Date   • Deep vein thrombosis (HCC)    • Functional diarrhea 9/9/2020   • History of total knee arthroplasty    • Hyperlipidemia    • Hypertension    • Hypothyroidism      Past Surgical History:   Procedure Laterality Date   • COLONOSCOPY     • HYSTERECTOMY     • REPLACEMENT TOTAL KNEE Left    • REPLACEMENT TOTAL KNEE Right    • TONSILLECTOMY     • TOTAL HIP ARTHROPLASTY Left       Family History   Problem Relation Age of Onset   • Hypertension Father    • Breast cancer Neg Hx    • Colon cancer Neg Hx    • Colon polyps Neg Hx      Social History     Tobacco Use   • Smoking status: Former Smoker     Types: Cigarettes     Quit date:      Years since quittin.1   • Smokeless tobacco: Never Used   • Tobacco comment: caffeine use: 1 cup daily   Substance Use Topics   • Alcohol use: Not Currently   • Drug use: Never     Medications Prior to Admission   Medication Sig Dispense Refill Last Dose   • acetaminophen (TYLENOL) 650 MG 8 hr tablet Take 650 mg by mouth As Needed.      • citalopram (CeleXA) 20 MG tablet Take 20 mg by mouth Daily.      • famotidine (PEPCID) 40 MG tablet       • levothyroxine (SYNTHROID, LEVOTHROID) 88 MCG tablet Take 88 mcg by mouth Daily.      • lovastatin (MEVACOR) 20 MG tablet Take 20 mg by mouth Every Night.      • metoprolol succinate XL (TOPROL-XL) 50 MG 24 hr tablet Take 1 tablet by mouth 2 (Two) Times a Day. 180 tablet 3    • omeprazole (priLOSEC) 40 MG capsule Take 1 capsule by mouth Daily. 90 capsule 3    • traMADol (ULTRAM) 50 MG tablet Take 50 mg by mouth As Needed.        Allergies:  Codeine and Sulfa antibiotics     Review of Systems   Constitutional: Negative for activity change, appetite change, chills, fatigue and fever.   HENT: Negative for congestion.    Respiratory: Negative for cough, chest tightness, shortness of breath and wheezing.    Cardiovascular: Negative for chest pain, palpitations and leg swelling.   Gastrointestinal: Negative for abdominal distention, abdominal pain, diarrhea, nausea and vomiting.   Endocrine: Negative for polyphagia and polyuria.   Genitourinary: Negative for frequency.   Musculoskeletal: Positive for arthralgias (Both hands), myalgias (Posterior neck and shoulders), neck pain and neck stiffness.   Skin: Negative for rash.   Neurological: Negative for weakness and light-headedness.   Hematological: Does not  "bruise/bleed easily.   Psychiatric/Behavioral: Negative for agitation and behavioral problems.       Vital Signs  Temp:  [96.3 °F (35.7 °C)-98.5 °F (36.9 °C)] 96.3 °F (35.7 °C)  Heart Rate:  [67-88] 72  Resp:  [16-18] 16  BP: ()/(42-88) 102/55  Oxygen Therapy  SpO2: 98 %  Pulse Oximetry Type: Continuous  Device (Oxygen Therapy): nasal cannula  Flow (L/min): 2}  Body mass index is 27.8 kg/m².  Flowsheet Rows      First Filed Value   Admission Height 157.5 cm (62\") Documented at 02/18/2022 1240   Admission Weight 68.9 kg (152 lb) Documented at 02/18/2022 1240                 Physical Exam  Vitals and nursing note reviewed.   Constitutional:       Appearance: She is well-developed. She is obese.   HENT:      Head: Normocephalic.   Eyes:      Conjunctiva/sclera: Conjunctivae normal.   Neck:      Thyroid: No thyromegaly.      Vascular: No JVD.      Comments: Passive range of motion both flexion extension rotation painful.  She has moderate spasm noted in the lower trapezius bilaterally supraspinatus bilaterally  Cardiovascular:      Rate and Rhythm: Normal rate and regular rhythm.      Pulses:           Carotid pulses are 3+ on the right side and 3+ on the left side.       Radial pulses are 3+ on the right side and 1+ on the left side.        Posterior tibial pulses are 3+ on the right side and 3+ on the left side.      Heart sounds: Normal heart sounds. No murmur heard.      Pulmonary:      Effort: Pulmonary effort is normal. No respiratory distress.      Breath sounds: Normal breath sounds. No wheezing or rales.   Abdominal:      General: Bowel sounds are normal. There is no distension.      Palpations: Abdomen is soft.      Tenderness: There is no abdominal tenderness. There is no guarding.   Musculoskeletal:      Cervical back: Muscular tenderness present.      Right lower leg: No edema.      Left lower leg: No edema.   Lymphadenopathy:      Cervical: Cervical adenopathy present.   Skin:     General: Skin is " warm and dry.      Findings: No rash.   Neurological:      General: No focal deficit present.      Mental Status: She is alert and oriented to person, place, and time.   Psychiatric:         Attention and Perception: Attention normal.         Mood and Affect: Mood is anxious.         Speech: Speech normal.         Behavior: Behavior normal.          Debilities/Disabilities Identified: None    Emotional Behavior: Anxious    Result Review    Result Review:  I have personally reviewed the results from the time of this admission to 2/18/2022 22:23 EST and agree with these findings:  [x]  Laboratory  []  Microbiology  [x]  Radiology  [x]  EKG/Telemetry   [x]  Cardiology/Vascular   []  Pathology  [x]  Old records  []  Other:          Results Review:    I reviewed the patient's new clinical results.  Lab Results (most recent)     Procedure Component Value Units Date/Time    Urinalysis, Microscopic Only - Urine, Clean Catch [[id number]]  (Abnormal) Collected: 02/18/22 2006    Specimen: Urine, Clean Catch Updated: 02/18/22 2023     RBC, UA 3-5 /HPF      WBC, UA 13-20 /HPF      Bacteria, UA 2+ /HPF      Squamous Epithelial Cells, UA 7-12 /HPF      Transitional Epithelial Cells, UA 3-6 /HPF      Hyaline Casts, UA 3-6 /LPF      Methodology Manual Light Microscopy    Urinalysis With Microscopic If Indicated (No Culture) - Urine, Clean Catch [[id number]]  (Abnormal) Collected: 02/18/22 2006    Specimen: Urine, Clean Catch Updated: 02/18/22 2019     Color, UA Dark Yellow     Appearance, UA Clear     pH, UA <=5.0     Specific Gravity, UA 1.025     Glucose, UA Negative     Ketones, UA Negative     Bilirubin, UA Small (1+)     Blood, UA Trace     Protein, UA 30 mg/dL (1+)     Leuk Esterase, UA Small (1+)     Nitrite, UA Negative     Urobilinogen, UA 1.0 E.U./dL    aPTT [[id number]]  (Normal) Collected: 02/18/22 1308    Specimen: Blood Updated: 02/18/22 1727     PTT 37.6 seconds     Narrative:      PTT = The equivalent PTT values for the  therapeutic range of heparin levels at 0.1 to 0.7 U/ml are 53 to 110 seconds.      Protime-INR [[id number]]  (Normal) Collected: 02/18/22 1308    Specimen: Blood Updated: 02/18/22 1727     Protime 13.9 Seconds      INR 1.05    Narrative:      Therapeutic Ranges for INR: 2.0-3.0 (PT 20-30)                              2.5-3.5 (PT 25-34)    D-dimer, Quantitative [[id number]]  (Abnormal) Collected: 02/18/22 1308    Specimen: Blood Updated: 02/18/22 1428     D-Dimer, Quantitative 6.66 MCGFEU/mL     Narrative:      Can be elevated in, but is not diagnostic for deep vein thrombosis (DVT) or pulmonary embolis (PE).  It is also elevated in other medical conditions.  Clinical correlation is required.  The negative cut-off value for the D-Dimer is 0.50 mcg FEU/mL for DVT and PE.      Troponin [[id number]]  (Normal) Collected: 02/18/22 1308    Specimen: Blood Updated: 02/18/22 1338     Troponin T <0.010 ng/mL     Narrative:      Troponin T Reference Range:  <= 0.03 ng/mL-   Negative for AMI  >0.03 ng/mL-     Abnormal for myocardial necrosis.  Clinicians would have to utilize clinical acumen, EKG, Troponin and serial changes to determine if it is an Acute Myocardial Infarction or myocardial injury due to an underlying chronic condition.       Results may be falsely decreased if patient taking Biotin.      COVID-19 and FLU A/B PCR - Swab, Nasopharynx [[id number]]  (Normal) Collected: 02/18/22 1308    Specimen: Swab from Nasopharynx Updated: 02/18/22 1336     COVID19 Not Detected     Influenza A PCR Not Detected     Influenza B PCR Not Detected    Narrative:      Fact sheet for providers: https://www.fda.gov/media/779470/download    Fact sheet for patients: https://www.fda.gov/media/881305/download    Test performed by PCR.    Comprehensive Metabolic Panel [[id number]]  (Abnormal) Collected: 02/18/22 1308    Specimen: Blood Updated: 02/18/22 1335     Glucose 144 mg/dL      BUN 28 mg/dL      Creatinine 1.91 mg/dL      Sodium 130 mmol/L       Potassium 4.1 mmol/L      Chloride 93 mmol/L      CO2 24.0 mmol/L      Calcium 9.6 mg/dL      Total Protein 7.9 g/dL      Albumin 3.90 g/dL      ALT (SGPT) 18 U/L      AST (SGOT) 34 U/L      Alkaline Phosphatase 141 U/L      Total Bilirubin 1.1 mg/dL      eGFR Non African Amer 25 mL/min/1.73      Globulin 4.0 gm/dL      A/G Ratio 1.0 g/dL      BUN/Creatinine Ratio 14.7     Anion Gap 13.0 mmol/L     Narrative:      GFR Normal >60  Chronic Kidney Disease <60  Kidney Failure <15      CBC & Differential [[id number]]  (Abnormal) Collected: 02/18/22 1308    Specimen: Blood Updated: 02/18/22 1317    Narrative:      The following orders were created for panel order CBC & Differential.  Procedure                               Abnormality         Status                     ---------                               -----------         ------                     CBC Auto Differential[[id number]]        Abnormal            Final result                 Please view results for these tests on the individual orders.    CBC Auto Differential [[id number]]  (Abnormal) Collected: 02/18/22 1308    Specimen: Blood Updated: 02/18/22 1317     WBC 13.99 10*3/mm3      RBC 3.87 10*6/mm3      Hemoglobin 11.8 g/dL      Hematocrit 37.1 %      MCV 95.9 fL      MCH 30.5 pg      MCHC 31.8 g/dL      RDW 13.6 %      RDW-SD 48.0 fl      MPV 12.0 fL      Platelets 180 10*3/mm3      Neutrophil % 82.1 %      Lymphocyte % 7.6 %      Monocyte % 9.1 %      Eosinophil % 0.4 %      Basophil % 0.2 %      Immature Grans % 0.6 %      Neutrophils, Absolute 11.49 10*3/mm3      Lymphocytes, Absolute 1.06 10*3/mm3      Monocytes, Absolute 1.27 10*3/mm3      Eosinophils, Absolute 0.06 10*3/mm3      Basophils, Absolute 0.03 10*3/mm3      Immature Grans, Absolute 0.08 10*3/mm3      nRBC 0.0 /100 WBC           Imaging Results (Most Recent)     Procedure Component Value Units Date/Time    CT Angiogram Aorta [[id number]] Collected: 02/18/22 1856     Updated: 02/18/22 1858     Narrative:      CT ANGIOGRAM AORTA    INDICATION:   Unequal blood pressures in the upper extremities. Low blood pressure in the left arm. Neck pain and stiffness for 3 days.    TECHNIQUE:   CT angiogram of the chest with IV contrast. 3-D reconstructions were obtained and reviewed.   Radiation dose reduction techniques included automated exposure control or exposure modulation based on body size. Count of known CT and cardiac nuc med studies  performed in previous 12 months: 0.     COMPARISON:   CT chest without contrast 2/18/2022    FINDINGS:   Adequate opacification of the pulmonary arteries with subsegmental filling defects in the left lower lobe, consistent with acute pulmonary emboli. Questionable small subsegmental filling defect in the right upper lobe pulmonary artery. Thoracic aorta  normal in course and caliber without dissection. High-grade stenosis at the left subclavian artery origin. Remaining great vessel origins are widely patent. Heart size is normal. No pericardial effusion.    No pleural effusion. No pneumothorax. Bibasilar dependent subsegmental atelectasis. Linear groundglass opacities in the bilateral lung apices which may represent atelectasis or sequelae of previous infection.    Included upper abdomen demonstrates cholelithiasis.    No acute osseous abnormality.      Impression:        1. Acute pulmonary emboli in the left lower lobe subsegmental pulmonary arteries. Questionable small subsegmental filling defects in the right upper lobe pulmonary artery. No CT evidence of acute right heart strain.  2. Negative for thoracic aortic aneurysm/dissection.  3. High-grade stenosis at the left subclavian artery origin.  4. Bibasilar subsegmental dependent atelectasis. Linear groundglass opacities in the bilateral lung apices may represent atelectasis or sequelae of previous infection.  5. Cholelithiasis.    NOTIFICATION: Critical Value/emergent results were called by telephone at the time of  interpretation on 2/18/2022 6:52 PM to the emergency room physician, Dr. Elmo Blount, who verbally acknowledged these results.    Signer Name: Farhan Michael MD   Signed: 2/18/2022 6:56 PM   Workstation Name: [id number]    [facility] Lung Scan Perfusion Particulate [[id number]] Collected: 02/18/22 1601     Updated: 02/18/22 1605    Narrative:      RADIONUCLIDE PERFUSION LUNG SCAN, 02/18/2022     HISTORY:   76-year-old female with hypoxia, elevated d-dimer.     DOSE:   5.9 mCi technetium labeled MAA intravenously.     NOTE: Ventilation lung imaging is currently not allowed under COVID-19  restrictions on aerosol generating procedures.     COMPARISON:   Noncontrast CT chest today.     FINDINGS:   There is uniform distribution of perfusion activity throughout both  lungs. No significant focal or multifocal perfusion abnormality is  demonstrated to suggest pulmonary embolism.       Impression:      1. No perfusion abnormality is demonstrated to suggest pulmonary  embolism.  2. Perfusion-only imaging.     This report was finalized on 2/18/2022 4:03 PM by Dr. Miguel Oliveros MD.       CT Cervical Spine Without Contrast [[id number]] Collected: 02/18/22 1521     Updated: 02/18/22 1523    Narrative:      CT Spine Cervical WO    INDICATION:   Sharp neck pain for the past 3 days radiating to both upper extremities, worse on the right    TECHNIQUE:   CT of the cervical spine without IV contrast. Coronal and sagittal reconstructions were obtained.  Radiation dose reduction techniques included automated exposure control or exposure modulation based on body size. Count of known CT and cardiac nuc med  studies performed in previous 12 months: 0.     COMPARISON:  None available.    FINDINGS:  There is advanced degenerative change at the articulation of the odontoid with the anterior arch of C1 accompanied by thickening and calcification of the transverse ligament. This causes moderate narrowing  just below the foramen magnum but no cord  compression.    At C2-3 the disc is unremarkable. Facet arthropathy is moderate bilaterally. The right foramen is mildly narrowed.    At C3-4 there is moderate bilateral facet arthropathy. The disc is narrowed and there is a grade 1 anterolisthesis of approximately 3 mm. Foraminal narrowing is mild on the left and moderate on the right.    At C4-5 there is disc space narrowing with a degenerative grade 1 anterolisthesis of approximately 3 mm. Posterior disc bulging with osteophyte is noted to a mild degree. Facet arthropathy is moderately severe bilaterally. Central stenosis is mild.  Foraminal narrowing is mild on the left and moderate on the right.    Postoperative changes of anterior fusion are seen across C5-C6. There is no evidence of pseudoarthrosis. Foraminal narrowing is mild bilaterally. The facet joints are ankylosed.    At C6-7 there is advanced degenerative disc disease with disc space collapse and reactive endplate changes. Posterior osteophyte formation is seen to a mild degree extending to the uncovertebral joints. Foraminal narrowing is mild bilaterally.    At C7-T1 the disc is unremarkable. There is moderate facet arthropathy on the right. The right foramen is mildly narrowed.    No fractures are seen. No destructive bone lesions are noted. Paraspinous soft tissues are unremarkable. IMPRESSION:  Intact fusion at C5-6. Multilevel degenerative changes as described above level by level. No acute findings.    Signer Name: Harpreet Harris MD   Signed: 2/18/2022 3:21 PM   Workstation Name: [id number]    [facility]    CT Chest Without Contrast Diagnostic [[id number]] Collected: 02/18/22 1518     Updated: 02/18/22 1520    Narrative:      CT Chest WO    INDICATION:   76-year-old emergency department patient with hypoxia. Low oxygen saturation levels in emergency department today    TECHNIQUE:   CT of the thorax without IV contrast. Coronal and  sagittal reconstructions were obtained.  Radiation dose reduction techniques included automated exposure control or exposure modulation based on body size. Count of known CT and cardiac nuc med studies  performed in previous 12 months: 0.     COMPARISON:   None available.    FINDINGS:  Images through the thoracic inlet are normal.    Images of the chest demonstrate atherosclerotic change of the aorta and coronary arteries. The ascending aorta measures 3.2 cm which is within normal limits. Cardiac chambers, pericardium and esophagus are normal.    Lung window images demonstrate linear reticular/ground glass change in the right upper lobe on image 26 series 4 in the posterior left upper lobe best seen on image 37 series 4. These changes are nonspecific. There is dependent density of both lung bases  consistent with chronic atelectasis or scar. There is no definite acute pneumonia or edema. There are no effusions.    Limited views through the upper abdomen demonstrate benign calcified granulomatous changes in the liver and spleen. No adrenal lesion.    Bone window images demonstrate multilevel degenerative change with mild underlying scoliosis of the lower thoracic spine. No acute fracture.      Impression:          1. There is linear/groundglass change in the posterior right upper lobe and left upper lobe which is nonspecific and could represent atelectasis or sequela of previous infection.    2. There are linear dependent densities at both bases most consistent with chronic scar or atelectasis.    3. No evidence of acute edema or pneumonia.    4. The presence of pulmonary embolism cannot be excluded without contrast. The aorta is normal in caliber. There are extensive atherosclerotic calcification in the aorta and coronary arteries.    Signer Name: Josette Tong MD   Signed: 2/18/2022 3:18 PM   Workstation Name: [id number]    [facility]        reviewed    ECG/EMG Results (most recent)      Procedure Component Value Units Date/Time    ECG 12 Lead [[id number]] Collected: 02/18/22 1320     Updated: 02/18/22 1323     QT Interval 398 ms     Narrative:      HEART RATE= 76  bpm  RR Interval= 792  ms  TX Interval= 163  ms  P Horizontal Axis=   deg  P Front Axis= 38  deg  QRSD Interval= 89  ms  QT Interval= 398  ms  QRS Axis= 1  deg  T Wave Axis= 19  deg  - ABNORMAL ECG -  Sinus rhythm  Probable LVH with secondary repol abnrm  Electronically Signed By:   Date and Time of Study: 2022-02-18 13:20:47        reviewed    Assessment/Plan       1.  Left lower lobe pulmonary emboli patient will be started on Eliquis get a venous Doppler in the a.m. is negative will need hypercoagulable work-up.  Is on estrogen replacement we will stop for now    2.  Acute hypoxic respiratory failure uncertain etiology likely due to her pulmonary emboli although there are small subsegmental we will continue to monitor O2 to keep sats above 90%.  Chest x-ray today shows some possible atelectasis start incentive spirometry pulmonary toilet.  We will consider getting a pulmonary consult if hypoxia is persistent    3.  Subclavian stenosis new diagnosis will need work-up as outpatient    4.  Hypertension controlled continue with metoprolol      5.  Chronic kidney disease stage III suppressing her labs yesterday showed normal kidney functions but her creatinine is baseline today at 1.3.  We will covid and give her some IV fluids with use of IV contrast monitor renal functions carefully    6.  Cervical degenerative disc disease with severe neck pain started on baclofen heating pad we will give her 1 dose of IM steroids    7.  Possible tract infection in and out catheter repeated significant epithelials were noted    8.  Leukocytosis likely due to demargination as well as possible urinary tract patient will repeat CBC in a.m.    9.  Hypothyroidism nothing acute continue with home thyroid supplementation check TSH    10.  Muscle joint  osteoarthritis as needed Tylenol no NSAId's with history of chronic kidney disease     11.  Major depression anxiety stable continue with citalopram    12.  GERD continue with Pepcid    13.  Asymptomatic cholelithiasis discussed with patient      I discussed the patients findings and my recommendations with patient     Mica Girard MD  02/18/22  22:23 EST        Much of this encounter note is an electronic transcription/translation of spoken language to printed text. The electronic translation of spoken language may permit erroneous, or at times, nonsensical words or phrases to be inadvertently transcribed; Although I have reviewed the note for such errors, some may still exist.

## 2022-02-19 NOTE — CASE MANAGEMENT/SOCIAL WORK
Discharge Planning Assessment   Terese Mckenzie     Patient Name: Janna Moy  MRN: [id number]  Today's Date: 2/19/2022    Admit Date: 2/18/2022     Discharge Needs Assessment     Row Name 02/19/22 1116       Living Environment    Lives With spouse; grandchild(buddy)    Name(s) of Who Lives With Patient Atilio Moy and 18 year old great granddaughter    Current Living Arrangements home/apartment/condo  two story house with no steps to gain entry    Duration at Residence 24 Y    Potentially Unsafe Housing Conditions --  none    Primary Care Provided by self    Provides Primary Care For no one    Caregiving Concerns no care giving concerns voiced by patient at this time.    Family Caregiver if Needed grandchild(buddy), adult; spouse    Family Caregiver Names Don,  and great granddaughter    Quality of Family Relationships unable to assess    Able to Return to Prior Arrangements yes    Living Arrangement Comments Patient states she lives with her  and great granddaughter in a two story house with no steps to gain entry       Resource/Environmental Concerns    Resource/Environmental Concerns none    Transportation Concerns --  none       Transition Planning    Patient/Family Anticipates Transition to home with family    Patient/Family Anticipated Services at Transition     Transportation Anticipated family or friend will provide  pt states that either her  or daughter will provide ride home at discharge       Discharge Needs Assessment    Readmission Within the Last 30 Days no previous admission in last 30 days    Current Outpatient/Agency/Support Group --  none    Equipment Currently Used at Home --  pt states she does not currently use any equipment at home but has a rolling walker if needed    Concerns to be Addressed denies needs/concerns at this time    Concerns Comments no discharge needs voiced by patient at this time.    Anticipated Changes Related to Illness none    Equipment Needed  After Discharge none    Outpatient/Agency/Support Group Needs --  pt states she will not need these services at discharge    Discharge Facility/Level of Care Needs --  pt states she will not need these services at discharge    Provided Post Acute Provider List? Refused    Refused Provider List Comment offered community resources but patient declines the need for them at this time.    Patient's Choice of Community Agency(s) pt states she has used [facility] in the past and would use them again if needed    Current Discharge Risk --  none    Discharge Coordination/Progress Patient states she plans on returning home at discharge with her family to help if needed, no discharge needs voiced by patient at this time.               Discharge Plan     Row Name 02/19/22 1122       Plan    Plan Home with     Patient/Family in Agreement with Plan yes    Plan Comments Into room and introduced self and role of CM. Discussed discharge disposition with patient at bedside. Patient is currently resting in bed and remains on room air and has no complaints. Patient confirms that the info on her face sheet is correct and that she see's Dr. Harpreet Alcantara as PCP. She states that she uses [facility] in [place] and normally has no problem picking up or paying for her meds. CM provided patient with a 30 day free trial card for her Eliquis she will be discharging on and she verbalized understanding. Patient states she does have a living will but it is not on file here and CM encouraged her to bring in a copy at her earliest convenience. Patient states she lives with her  and great granddaughter in a two story house with no steps to gain entry and states she has no problem maneuvering the steps or within the home. She states that she is independent with her ADL's and drives. She states that either her  or daughter will be able to provide ride home at discharge. She also states that she is not currently use any DME at  home but has a rolling walker she can use if needed, and does not anticipate needing any equipment at discharge. Patient states she has used [facility] in the past and would use them again if needed. She states she will not need home health at discharge. CM offered community resources such as home health, STR and LTC but patient declines the need for them at this time. Patient states she plans on returning home at discharge with her family to help as needed, no discharge needs voiced by patient at this time. Patient had no other questions or concerns regarding discharge plans. Name and number placed on white board in room. CM will continue to follow for needs.              Continued Care and Services - Admitted Since 2/18/2022    Coordination has not been started for this encounter.          Demographic Summary     Row Name 02/19/22 1115       General Information    Admission Type inpatient    Arrived From home    Referral Source admission list    Reason for Consult discharge planning    Preferred Language English     Used During This Interaction no       Contact Information    Permission Granted to Share Info With                Functional Status    No documentation.                Psychosocial    No documentation.                Abuse/Neglect    No documentation.                Legal    No documentation.                Substance Abuse    No documentation.                Patient Forms    No documentation.                   Jessica Taylor, DIAMOND

## 2022-02-19 NOTE — PLAN OF CARE
Goal Outcome Evaluation:  Plan of Care Reviewed With: patient      Patient c/o pain to her neck will reduce from 10 to 5.

## 2022-02-20 ENCOUNTER — READMISSION MANAGEMENT (OUTPATIENT)
Dept: CALL CENTER | Facility: HOSPITAL | Age: 77
End: 2022-02-20

## 2022-02-20 LAB — QT INTERVAL: 398 MS

## 2022-02-20 NOTE — OUTREACH NOTE
Prep Survey      Responses   [facility] facility patient discharged from? [facility]   Is LACE score < 7 ? Yes   Emergency Room discharge w/ pulse ox? No   Eligibility Readm Mgmt   Discharge diagnosis  Acute Left lower lobe subsegmental pulmonary emboli  Acute hypoxic respiratory failure    Does the patient have one of the following disease processes/diagnoses(primary or secondary)? Other   Does the patient have Home health ordered? No   Is there a DME ordered? No   Prep survey completed? Yes          Socorro Ang RN

## 2022-02-23 ENCOUNTER — READMISSION MANAGEMENT (OUTPATIENT)
Dept: CALL CENTER | Facility: HOSPITAL | Age: 77
End: 2022-02-23

## 2022-02-23 NOTE — OUTREACH NOTE
LAG < 7 Survey      Responses   [facility] patient discharged from? [facility]   Does the patient have one of the following disease processes/diagnoses(primary or secondary)? Other   [facility] <7 Attempt successful? Yes   Call start time 0949   Call end time 1003   Discharge diagnosis  Acute Left lower lobe subsegmental pulmonary emboli  Acute hypoxic respiratory failure    Meds reviewed with patient/caregiver? Yes   Is the patient having any side effects they believe may be caused by any medication additions or changes? No   Does the patient have all medications ordered at discharge? Yes   Is the patient taking all medications as directed (includes completed medication regime)? Yes   Comments regarding appointments Patient is having a good amount of pain in her neck and upper back. She will call MD today.    Does the patient have a primary care provider?  Yes   Comments regarding PCP Has a followup scheduled on 3/29/2022 with neurosurgeon    Has the patient kept scheduled appointments due by today? N/A   Psychosocial issues? No   Did the patient receive a copy of their discharge instructions? Yes   Nursing interventions Reviewed instructions with patient   What is the patient's perception of their health status since discharge? Improving   Is the patient/caregiver able to teach back signs and symptoms related to disease process for when to call PCP? Yes   Is the patient/caregiver able to teach back signs and symptoms related to disease process for when to call 911? Yes   Is the patient/caregiver able to teach back the hierarchy of who to call/visit for symptoms/problems? PCP, Specialist, Home health nurse, Urgent Care, ED, 911 Yes   If the patient is a current smoker, are they able to teach back resources for cessation? Not a smoker   Graduated Yes   Is the patient interested in additional calls from an ambulatory ?  NOTE:  applies to high risk patients requiring additional follow-up. No   Wrap up  additional comments Patient is having alot of pain. She will call surgeon as she does not have a followup until end of March.           Lester Manuel RN

## 2022-02-26 ENCOUNTER — APPOINTMENT (OUTPATIENT)
Dept: GENERAL RADIOLOGY | Facility: HOSPITAL | Age: 77
End: 2022-02-26

## 2022-02-26 ENCOUNTER — APPOINTMENT (OUTPATIENT)
Dept: CT IMAGING | Facility: HOSPITAL | Age: 77
End: 2022-02-26

## 2022-02-26 ENCOUNTER — HOSPITAL ENCOUNTER (OUTPATIENT)
Facility: HOSPITAL | Age: 77
Discharge: HOME | End: 2022-03-01
Attending: EMERGENCY MEDICINE | Admitting: INTERNAL MEDICINE

## 2022-02-26 DIAGNOSIS — R41.82 ALTERED MENTAL STATUS, UNSPECIFIED ALTERED MENTAL STATUS TYPE: Primary | ICD-10-CM

## 2022-02-26 DIAGNOSIS — R51.9 NONINTRACTABLE HEADACHE, UNSPECIFIED CHRONICITY PATTERN, UNSPECIFIED HEADACHE TYPE: ICD-10-CM

## 2022-02-26 DIAGNOSIS — K81.0 ACUTE CHOLECYSTITIS: ICD-10-CM

## 2022-02-26 LAB
ALBUMIN SERPL-MCNC: 4.3 G/DL (ref 3.5–5.2)
ALBUMIN/GLOB SERPL: 1.1 G/DL
ALP SERPL-CCNC: 155 U/L (ref 39–117)
ALT SERPL W P-5'-P-CCNC: 21 U/L (ref 1–33)
AMMONIA BLD-SCNC: 13 UMOL/L (ref 11–51)
AMPHET+METHAMPHET UR QL: NEGATIVE
AMPHETAMINES UR QL: NEGATIVE
ANION GAP SERPL CALCULATED.3IONS-SCNC: 15.7 MMOL/L (ref 5–15)
APTT PPP: 30.6 SECONDS (ref 24.3–38.1)
AST SERPL-CCNC: 19 U/L (ref 1–32)
BACTERIA UR QL AUTO: ABNORMAL /HPF
BARBITURATES UR QL SCN: NEGATIVE
BASOPHILS # BLD AUTO: 0.04 10*3/MM3 (ref 0–0.2)
BASOPHILS NFR BLD AUTO: 0.3 % (ref 0–1.5)
BENZODIAZ UR QL SCN: NEGATIVE
BILIRUB SERPL-MCNC: 0.6 MG/DL (ref 0–1.2)
BILIRUB UR QL STRIP: NEGATIVE
BUN SERPL-MCNC: 18 MG/DL (ref 8–23)
BUN/CREAT SERPL: 18 (ref 7–25)
BUPRENORPHINE SERPL-MCNC: NEGATIVE NG/ML
CALCIUM SPEC-SCNC: 10.1 MG/DL (ref 8.6–10.5)
CANNABINOIDS SERPL QL: NEGATIVE
CHLORIDE SERPL-SCNC: 100 MMOL/L (ref 98–107)
CHOLEST SERPL-MCNC: 176 MG/DL (ref 0–200)
CLARITY UR: CLEAR
CO2 SERPL-SCNC: 23.3 MMOL/L (ref 22–29)
COCAINE UR QL: NEGATIVE
COLOR UR: YELLOW
CREAT SERPL-MCNC: 1 MG/DL (ref 0.57–1)
DEPRECATED RDW RBC AUTO: 46 FL (ref 37–54)
EOSINOPHIL # BLD AUTO: 0.1 10*3/MM3 (ref 0–0.4)
EOSINOPHIL NFR BLD AUTO: 0.9 % (ref 0.3–6.2)
ERYTHROCYTE [DISTWIDTH] IN BLOOD BY AUTOMATED COUNT: 13.4 % (ref 12.3–15.4)
ETHANOL BLD-MCNC: <10 MG/DL (ref 0–10)
ETHANOL UR QL: <0.01 %
GFR SERPL CREATININE-BSD FRML MDRD: 54 ML/MIN/1.73
GLOBULIN UR ELPH-MCNC: 3.8 GM/DL
GLUCOSE BLDC GLUCOMTR-MCNC: 84 MG/DL (ref 70–130)
GLUCOSE SERPL-MCNC: 119 MG/DL (ref 65–99)
GLUCOSE UR STRIP-MCNC: NEGATIVE MG/DL
HCT VFR BLD AUTO: 43.3 % (ref 34–46.6)
HDLC SERPL-MCNC: 45 MG/DL (ref 40–60)
HGB BLD-MCNC: 13.8 G/DL (ref 12–15.9)
HGB UR QL STRIP.AUTO: NEGATIVE
HOLD SPECIMEN: NORMAL
HOLD SPECIMEN: NORMAL
HYALINE CASTS UR QL AUTO: ABNORMAL /LPF
IMM GRANULOCYTES # BLD AUTO: 0.08 10*3/MM3 (ref 0–0.05)
IMM GRANULOCYTES NFR BLD AUTO: 0.7 % (ref 0–0.5)
INR PPP: 1.02 (ref 0.9–1.1)
KETONES UR QL STRIP: NEGATIVE
LDLC SERPL CALC-MCNC: 106 MG/DL (ref 0–100)
LDLC/HDLC SERPL: 2.29 {RATIO}
LEUKOCYTE ESTERASE UR QL STRIP.AUTO: NEGATIVE
LYMPHOCYTES # BLD AUTO: 2.33 10*3/MM3 (ref 0.7–3.1)
LYMPHOCYTES NFR BLD AUTO: 20.3 % (ref 19.6–45.3)
MCH RBC QN AUTO: 30 PG (ref 26.6–33)
MCHC RBC AUTO-ENTMCNC: 31.9 G/DL (ref 31.5–35.7)
MCV RBC AUTO: 94.1 FL (ref 79–97)
METHADONE UR QL SCN: NEGATIVE
MONOCYTES # BLD AUTO: 0.77 10*3/MM3 (ref 0.1–0.9)
MONOCYTES NFR BLD AUTO: 6.7 % (ref 5–12)
NEUTROPHILS NFR BLD AUTO: 71.1 % (ref 42.7–76)
NEUTROPHILS NFR BLD AUTO: 8.14 10*3/MM3 (ref 1.7–7)
NITRITE UR QL STRIP: NEGATIVE
NRBC BLD AUTO-RTO: 0 /100 WBC (ref 0–0.2)
OPIATES UR QL: NEGATIVE
OXYCODONE UR QL SCN: NEGATIVE
PCP UR QL SCN: NEGATIVE
PH UR STRIP.AUTO: 7.5 [PH] (ref 4.5–8)
PLATELET # BLD AUTO: 311 10*3/MM3 (ref 140–450)
PMV BLD AUTO: 11.5 FL (ref 6–12)
POTASSIUM SERPL-SCNC: 4.1 MMOL/L (ref 3.5–5.2)
PROPOXYPH UR QL: NEGATIVE
PROT SERPL-MCNC: 8.1 G/DL (ref 6–8.5)
PROT UR QL STRIP: ABNORMAL
PROTHROMBIN TIME: 13.6 SECONDS (ref 12.1–15)
RBC # BLD AUTO: 4.6 10*6/MM3 (ref 3.77–5.28)
RBC # UR STRIP: ABNORMAL /HPF
REF LAB TEST METHOD: ABNORMAL
SARS-COV-2 RNA PNL SPEC NAA+PROBE: NOT DETECTED
SODIUM SERPL-SCNC: 139 MMOL/L (ref 136–145)
SP GR UR STRIP: 1.01 (ref 1–1.03)
SQUAMOUS #/AREA URNS HPF: ABNORMAL /HPF
TRICYCLICS UR QL SCN: NEGATIVE
TRIGL SERPL-MCNC: 139 MG/DL (ref 0–150)
TROPONIN T SERPL-MCNC: <0.01 NG/ML (ref 0–0.03)
TSH SERPL DL<=0.05 MIU/L-ACNC: 0.62 UIU/ML (ref 0.27–4.2)
UROBILINOGEN UR QL STRIP: ABNORMAL
VLDLC SERPL-MCNC: 25 MG/DL (ref 5–40)
WBC # UR STRIP: ABNORMAL /HPF
WBC NRBC COR # BLD: 11.46 10*3/MM3 (ref 3.4–10.8)
WHOLE BLOOD HOLD SPECIMEN: NORMAL
WHOLE BLOOD HOLD SPECIMEN: NORMAL

## 2022-02-26 PROCEDURE — 82962 GLUCOSE BLOOD TEST: CPT

## 2022-02-26 PROCEDURE — 85730 THROMBOPLASTIN TIME PARTIAL: CPT | Performed by: EMERGENCY MEDICINE

## 2022-02-26 PROCEDURE — 99283 EMERGENCY DEPT VISIT LOW MDM: CPT

## 2022-02-26 PROCEDURE — 99220 PR INITIAL OBSERVATION CARE/DAY 70 MINUTES: CPT | Performed by: INTERNAL MEDICINE

## 2022-02-26 PROCEDURE — 85610 PROTHROMBIN TIME: CPT | Performed by: EMERGENCY MEDICINE

## 2022-02-26 PROCEDURE — 82140 ASSAY OF AMMONIA: CPT | Performed by: INTERNAL MEDICINE

## 2022-02-26 PROCEDURE — G0378 HOSPITAL OBSERVATION PER HR: HCPCS

## 2022-02-26 PROCEDURE — 71045 X-RAY EXAM CHEST 1 VIEW: CPT

## 2022-02-26 PROCEDURE — 70450 CT HEAD/BRAIN W/O DYE: CPT

## 2022-02-26 PROCEDURE — 96374 THER/PROPH/DIAG INJ IV PUSH: CPT

## 2022-02-26 PROCEDURE — 99284 EMERGENCY DEPT VISIT MOD MDM: CPT

## 2022-02-26 PROCEDURE — 85025 COMPLETE CBC W/AUTO DIFF WBC: CPT | Performed by: EMERGENCY MEDICINE

## 2022-02-26 PROCEDURE — 80053 COMPREHEN METABOLIC PANEL: CPT | Performed by: EMERGENCY MEDICINE

## 2022-02-26 PROCEDURE — 82746 ASSAY OF FOLIC ACID SERUM: CPT | Performed by: INTERNAL MEDICINE

## 2022-02-26 PROCEDURE — 82077 ASSAY SPEC XCP UR&BREATH IA: CPT | Performed by: EMERGENCY MEDICINE

## 2022-02-26 PROCEDURE — 94761 N-INVAS EAR/PLS OXIMETRY MLT: CPT

## 2022-02-26 PROCEDURE — 82607 VITAMIN B-12: CPT | Performed by: INTERNAL MEDICINE

## 2022-02-26 PROCEDURE — 99285 EMERGENCY DEPT VISIT HI MDM: CPT | Performed by: EMERGENCY MEDICINE

## 2022-02-26 PROCEDURE — 81001 URINALYSIS AUTO W/SCOPE: CPT | Performed by: EMERGENCY MEDICINE

## 2022-02-26 PROCEDURE — 87635 SARS-COV-2 COVID-19 AMP PRB: CPT | Performed by: EMERGENCY MEDICINE

## 2022-02-26 PROCEDURE — 74177 CT ABD & PELVIS W/CONTRAST: CPT

## 2022-02-26 PROCEDURE — C9803 HOPD COVID-19 SPEC COLLECT: HCPCS

## 2022-02-26 PROCEDURE — 94799 UNLISTED PULMONARY SVC/PX: CPT

## 2022-02-26 PROCEDURE — 80306 DRUG TEST PRSMV INSTRMNT: CPT | Performed by: EMERGENCY MEDICINE

## 2022-02-26 PROCEDURE — 84443 ASSAY THYROID STIM HORMONE: CPT | Performed by: INTERNAL MEDICINE

## 2022-02-26 PROCEDURE — 93005 ELECTROCARDIOGRAM TRACING: CPT | Performed by: EMERGENCY MEDICINE

## 2022-02-26 PROCEDURE — 93010 ELECTROCARDIOGRAM REPORT: CPT | Performed by: INTERNAL MEDICINE

## 2022-02-26 PROCEDURE — 25010000002 FENTANYL CITRATE (PF) 50 MCG/ML SOLUTION: Performed by: INTERNAL MEDICINE

## 2022-02-26 PROCEDURE — 80061 LIPID PANEL: CPT | Performed by: INTERNAL MEDICINE

## 2022-02-26 PROCEDURE — 0 IOPAMIDOL PER 1 ML: Performed by: INTERNAL MEDICINE

## 2022-02-26 PROCEDURE — 84484 ASSAY OF TROPONIN QUANT: CPT | Performed by: EMERGENCY MEDICINE

## 2022-02-26 RX ORDER — SODIUM CHLORIDE 0.9 % (FLUSH) 0.9 %
10 SYRINGE (ML) INJECTION AS NEEDED
Status: DISCONTINUED | OUTPATIENT
Start: 2022-02-26 | End: 2022-02-26

## 2022-02-26 RX ORDER — FAMOTIDINE 20 MG/1
40 TABLET, FILM COATED ORAL DAILY
Status: DISCONTINUED | OUTPATIENT
Start: 2022-02-26 | End: 2022-03-01 | Stop reason: HOSPADM

## 2022-02-26 RX ORDER — LABETALOL HYDROCHLORIDE 5 MG/ML
10 INJECTION, SOLUTION INTRAVENOUS ONCE
Status: COMPLETED | OUTPATIENT
Start: 2022-02-26 | End: 2022-02-26

## 2022-02-26 RX ORDER — LEVOTHYROXINE SODIUM 88 UG/1
88 TABLET ORAL DAILY
Status: DISCONTINUED | OUTPATIENT
Start: 2022-02-26 | End: 2022-03-01 | Stop reason: HOSPADM

## 2022-02-26 RX ORDER — ACETAMINOPHEN 650 MG/1
650 SUPPOSITORY RECTAL ONCE
Status: COMPLETED | OUTPATIENT
Start: 2022-02-26 | End: 2022-02-26

## 2022-02-26 RX ORDER — FENTANYL CITRATE 50 UG/ML
25 INJECTION, SOLUTION INTRAMUSCULAR; INTRAVENOUS
Status: DISCONTINUED | OUTPATIENT
Start: 2022-02-26 | End: 2022-02-27

## 2022-02-26 RX ORDER — AMLODIPINE BESYLATE 5 MG/1
5 TABLET ORAL
Status: DISCONTINUED | OUTPATIENT
Start: 2022-02-26 | End: 2022-03-01 | Stop reason: HOSPADM

## 2022-02-26 RX ORDER — ATORVASTATIN CALCIUM 40 MG/1
40 TABLET, FILM COATED ORAL NIGHTLY
Status: DISCONTINUED | OUTPATIENT
Start: 2022-02-26 | End: 2022-03-01 | Stop reason: HOSPADM

## 2022-02-26 RX ORDER — TRAMADOL HYDROCHLORIDE 50 MG/1
50 TABLET ORAL EVERY 6 HOURS PRN
Status: DISCONTINUED | OUTPATIENT
Start: 2022-02-26 | End: 2022-03-01 | Stop reason: HOSPADM

## 2022-02-26 RX ORDER — PIPERACILLIN SODIUM, TAZOBACTAM SODIUM 3; .375 G/15ML; G/15ML
INJECTION, POWDER, LYOPHILIZED, FOR SOLUTION INTRAVENOUS
Status: COMPLETED
Start: 2022-02-26 | End: 2022-02-27

## 2022-02-26 RX ORDER — SODIUM CHLORIDE 9 MG/ML
40 INJECTION, SOLUTION INTRAVENOUS AS NEEDED
Status: DISCONTINUED | OUTPATIENT
Start: 2022-02-26 | End: 2022-03-01 | Stop reason: HOSPADM

## 2022-02-26 RX ORDER — SODIUM CHLORIDE 0.9 % (FLUSH) 0.9 %
10 SYRINGE (ML) INJECTION AS NEEDED
Status: DISCONTINUED | OUTPATIENT
Start: 2022-02-26 | End: 2022-03-01 | Stop reason: HOSPADM

## 2022-02-26 RX ORDER — SODIUM CHLORIDE 9 MG/ML
INJECTION, SOLUTION INTRAVENOUS
Status: DISPENSED
Start: 2022-02-26 | End: 2022-02-27

## 2022-02-26 RX ORDER — PANTOPRAZOLE SODIUM 40 MG/1
40 TABLET, DELAYED RELEASE ORAL EVERY MORNING
Status: DISCONTINUED | OUTPATIENT
Start: 2022-02-27 | End: 2022-03-01 | Stop reason: HOSPADM

## 2022-02-26 RX ORDER — SODIUM CHLORIDE 0.9 % (FLUSH) 0.9 %
10 SYRINGE (ML) INJECTION EVERY 12 HOURS SCHEDULED
Status: DISCONTINUED | OUTPATIENT
Start: 2022-02-26 | End: 2022-03-01 | Stop reason: HOSPADM

## 2022-02-26 RX ORDER — METOPROLOL SUCCINATE 50 MG/1
50 TABLET, EXTENDED RELEASE ORAL 2 TIMES DAILY
Status: DISCONTINUED | OUTPATIENT
Start: 2022-02-26 | End: 2022-03-01 | Stop reason: HOSPADM

## 2022-02-26 RX ORDER — CITALOPRAM 20 MG/1
20 TABLET ORAL DAILY
Status: DISCONTINUED | OUTPATIENT
Start: 2022-02-26 | End: 2022-03-01 | Stop reason: HOSPADM

## 2022-02-26 RX ADMIN — APIXABAN 5 MG: 2.5 TABLET, FILM COATED ORAL at 23:23

## 2022-02-26 RX ADMIN — Medication 10 ML: at 23:22

## 2022-02-26 RX ADMIN — ATORVASTATIN CALCIUM 40 MG: 40 TABLET, FILM COATED ORAL at 23:23

## 2022-02-26 RX ADMIN — ACETAMINOPHEN 650 MG: 650 SUPPOSITORY RECTAL at 16:32

## 2022-02-26 RX ADMIN — AMLODIPINE BESYLATE 5 MG: 5 TABLET ORAL at 23:23

## 2022-02-26 RX ADMIN — LABETALOL HYDROCHLORIDE 10 MG: 5 INJECTION, SOLUTION INTRAVENOUS at 17:17

## 2022-02-26 RX ADMIN — TRAMADOL HYDROCHLORIDE 50 MG: 50 TABLET, FILM COATED ORAL at 21:53

## 2022-02-26 RX ADMIN — LEVOTHYROXINE SODIUM 88 MCG: 88 TABLET ORAL at 23:22

## 2022-02-26 RX ADMIN — IOPAMIDOL 100 ML: 755 INJECTION, SOLUTION INTRAVENOUS at 18:44

## 2022-02-26 RX ADMIN — METOPROLOL SUCCINATE 50 MG: 50 TABLET, EXTENDED RELEASE ORAL at 23:23

## 2022-02-26 RX ADMIN — FAMOTIDINE 40 MG: 20 TABLET ORAL at 23:22

## 2022-02-26 RX ADMIN — CITALOPRAM HYDROBROMIDE 20 MG: 20 TABLET ORAL at 23:22

## 2022-02-26 RX ADMIN — FENTANYL CITRATE 25 MCG: 50 INJECTION, SOLUTION INTRAMUSCULAR; INTRAVENOUS at 20:45

## 2022-02-27 PROBLEM — F05 DELIRIUM DUE TO ANOTHER MEDICAL CONDITION: Status: ACTIVE | Noted: 2022-02-27

## 2022-02-27 PROBLEM — K81.0 ACUTE CHOLECYSTITIS: Status: ACTIVE | Noted: 2022-02-27

## 2022-02-27 PROBLEM — R41.82 ALTERED MENTAL STATUS: Status: RESOLVED | Noted: 2022-02-26 | Resolved: 2022-02-27

## 2022-02-27 LAB
APTT PPP: 114.8 SECONDS (ref 24.3–38.1)
APTT PPP: 41.6 SECONDS (ref 24.3–38.1)
APTT PPP: 68.1 SECONDS (ref 24.3–38.1)
BASOPHILS # BLD AUTO: 0.06 10*3/MM3 (ref 0–0.2)
BASOPHILS NFR BLD AUTO: 0.6 % (ref 0–1.5)
DEPRECATED RDW RBC AUTO: 46.5 FL (ref 37–54)
EOSINOPHIL # BLD AUTO: 0.13 10*3/MM3 (ref 0–0.4)
EOSINOPHIL NFR BLD AUTO: 1.4 % (ref 0.3–6.2)
ERYTHROCYTE [DISTWIDTH] IN BLOOD BY AUTOMATED COUNT: 13.5 % (ref 12.3–15.4)
FOLATE SERPL-MCNC: 12.1 NG/ML (ref 4.78–24.2)
HCT VFR BLD AUTO: 39 % (ref 34–46.6)
HGB BLD-MCNC: 12.6 G/DL (ref 12–15.9)
IMM GRANULOCYTES # BLD AUTO: 0.07 10*3/MM3 (ref 0–0.05)
IMM GRANULOCYTES NFR BLD AUTO: 0.8 % (ref 0–0.5)
INR PPP: 1.31 (ref 0.9–1.1)
LYMPHOCYTES # BLD AUTO: 2.51 10*3/MM3 (ref 0.7–3.1)
LYMPHOCYTES NFR BLD AUTO: 27.1 % (ref 19.6–45.3)
MCH RBC QN AUTO: 30.6 PG (ref 26.6–33)
MCHC RBC AUTO-ENTMCNC: 32.3 G/DL (ref 31.5–35.7)
MCV RBC AUTO: 94.7 FL (ref 79–97)
MONOCYTES # BLD AUTO: 0.86 10*3/MM3 (ref 0.1–0.9)
MONOCYTES NFR BLD AUTO: 9.3 % (ref 5–12)
NEUTROPHILS NFR BLD AUTO: 5.64 10*3/MM3 (ref 1.7–7)
NEUTROPHILS NFR BLD AUTO: 60.8 % (ref 42.7–76)
NRBC BLD AUTO-RTO: 0 /100 WBC (ref 0–0.2)
PLATELET # BLD AUTO: 257 10*3/MM3 (ref 140–450)
PMV BLD AUTO: 11.6 FL (ref 6–12)
PROTHROMBIN TIME: 16.6 SECONDS (ref 12.1–15)
RBC # BLD AUTO: 4.12 10*6/MM3 (ref 3.77–5.28)
VIT B12 BLD-MCNC: 613 PG/ML (ref 211–946)
WBC NRBC COR # BLD: 9.27 10*3/MM3 (ref 3.4–10.8)

## 2022-02-27 PROCEDURE — 99203 OFFICE O/P NEW LOW 30 MIN: CPT | Performed by: SURGERY

## 2022-02-27 PROCEDURE — 85610 PROTHROMBIN TIME: CPT | Performed by: INTERNAL MEDICINE

## 2022-02-27 PROCEDURE — 99225 PR SBSQ OBSERVATION CARE/DAY 25 MINUTES: CPT | Performed by: INTERNAL MEDICINE

## 2022-02-27 PROCEDURE — 94761 N-INVAS EAR/PLS OXIMETRY MLT: CPT

## 2022-02-27 PROCEDURE — 94799 UNLISTED PULMONARY SVC/PX: CPT

## 2022-02-27 PROCEDURE — 85025 COMPLETE CBC W/AUTO DIFF WBC: CPT | Performed by: INTERNAL MEDICINE

## 2022-02-27 PROCEDURE — 25010000002 HEPARIN (PORCINE) PER 1000 UNITS: Performed by: INTERNAL MEDICINE

## 2022-02-27 PROCEDURE — 96376 TX/PRO/DX INJ SAME DRUG ADON: CPT

## 2022-02-27 PROCEDURE — 96365 THER/PROPH/DIAG IV INF INIT: CPT

## 2022-02-27 PROCEDURE — 85730 THROMBOPLASTIN TIME PARTIAL: CPT | Performed by: INTERNAL MEDICINE

## 2022-02-27 PROCEDURE — 25010000002 PIPERACILLIN SOD-TAZOBACTAM PER 1 G: Performed by: STUDENT IN AN ORGANIZED HEALTH CARE EDUCATION/TRAINING PROGRAM

## 2022-02-27 PROCEDURE — 25010000002 FENTANYL CITRATE (PF) 50 MCG/ML SOLUTION: Performed by: INTERNAL MEDICINE

## 2022-02-27 PROCEDURE — G0378 HOSPITAL OBSERVATION PER HR: HCPCS

## 2022-02-27 PROCEDURE — 96368 THER/DIAG CONCURRENT INF: CPT

## 2022-02-27 PROCEDURE — 25010000002 PIPERACILLIN SOD-TAZOBACTAM PER 1 G

## 2022-02-27 PROCEDURE — 96366 THER/PROPH/DIAG IV INF ADDON: CPT

## 2022-02-27 PROCEDURE — 96375 TX/PRO/DX INJ NEW DRUG ADDON: CPT

## 2022-02-27 RX ORDER — FENTANYL CITRATE 50 UG/ML
25 INJECTION, SOLUTION INTRAMUSCULAR; INTRAVENOUS
Status: DISCONTINUED | OUTPATIENT
Start: 2022-02-27 | End: 2022-02-28

## 2022-02-27 RX ORDER — HEPARIN SOD,PORCINE/0.9 % NACL 25000/250
12 INTRAVENOUS SOLUTION INTRAVENOUS
Status: DISCONTINUED | OUTPATIENT
Start: 2022-02-27 | End: 2022-02-28

## 2022-02-27 RX ORDER — HEPARIN SODIUM 5000 [USP'U]/ML
30 INJECTION, SOLUTION INTRAVENOUS; SUBCUTANEOUS AS NEEDED
Status: DISCONTINUED | OUTPATIENT
Start: 2022-02-27 | End: 2022-03-01

## 2022-02-27 RX ORDER — HEPARIN SODIUM 5000 [USP'U]/ML
60 INJECTION, SOLUTION INTRAVENOUS; SUBCUTANEOUS AS NEEDED
Status: DISCONTINUED | OUTPATIENT
Start: 2022-02-27 | End: 2022-03-01

## 2022-02-27 RX ORDER — HEPARIN SODIUM 5000 [USP'U]/ML
60 INJECTION, SOLUTION INTRAVENOUS; SUBCUTANEOUS ONCE
Status: COMPLETED | OUTPATIENT
Start: 2022-02-27 | End: 2022-02-27

## 2022-02-27 RX ORDER — HEPARIN SODIUM 10000 [USP'U]/100ML
12 INJECTION, SOLUTION INTRAVENOUS
Status: DISCONTINUED | OUTPATIENT
Start: 2022-02-27 | End: 2022-02-27

## 2022-02-27 RX ADMIN — HEPARIN SODIUM 4000 UNITS: 5000 INJECTION, SOLUTION INTRAVENOUS; SUBCUTANEOUS at 09:33

## 2022-02-27 RX ADMIN — SODIUM CHLORIDE 3.38 G: 900 INJECTION, SOLUTION INTRAVENOUS at 16:10

## 2022-02-27 RX ADMIN — ATORVASTATIN CALCIUM 40 MG: 40 TABLET, FILM COATED ORAL at 22:00

## 2022-02-27 RX ADMIN — FENTANYL CITRATE 25 MCG: 50 INJECTION INTRAMUSCULAR; INTRAVENOUS at 22:01

## 2022-02-27 RX ADMIN — PIPERACILLIN SODIUM AND TAZOBACTAM SODIUM 3375 MG: 3; .375 INJECTION, POWDER, LYOPHILIZED, FOR SOLUTION INTRAVENOUS at 00:15

## 2022-02-27 RX ADMIN — METOPROLOL SUCCINATE 50 MG: 50 TABLET, EXTENDED RELEASE ORAL at 22:00

## 2022-02-27 RX ADMIN — TRAMADOL HYDROCHLORIDE 50 MG: 50 TABLET, FILM COATED ORAL at 11:51

## 2022-02-27 RX ADMIN — Medication 10 ML: at 22:00

## 2022-02-27 RX ADMIN — CITALOPRAM HYDROBROMIDE 20 MG: 20 TABLET ORAL at 08:55

## 2022-02-27 RX ADMIN — SODIUM CHLORIDE 3.38 G: 900 INJECTION, SOLUTION INTRAVENOUS at 08:56

## 2022-02-27 RX ADMIN — TRAMADOL HYDROCHLORIDE 50 MG: 50 TABLET, FILM COATED ORAL at 22:00

## 2022-02-27 RX ADMIN — HEPARIN SODIUM 12 UNITS/KG/HR: 5000 INJECTION, SOLUTION INTRAVENOUS; SUBCUTANEOUS at 09:33

## 2022-02-27 RX ADMIN — METOPROLOL SUCCINATE 50 MG: 50 TABLET, EXTENDED RELEASE ORAL at 08:55

## 2022-02-27 RX ADMIN — FENTANYL CITRATE 25 MCG: 50 INJECTION INTRAMUSCULAR; INTRAVENOUS at 11:51

## 2022-02-27 RX ADMIN — HEPARIN SODIUM 9 UNITS/KG/HR: 5000 INJECTION, SOLUTION INTRAVENOUS; SUBCUTANEOUS at 17:36

## 2022-02-27 RX ADMIN — PANTOPRAZOLE SODIUM 40 MG: 40 TABLET, DELAYED RELEASE ORAL at 06:10

## 2022-02-27 RX ADMIN — AMLODIPINE BESYLATE 5 MG: 5 TABLET ORAL at 08:55

## 2022-02-27 RX ADMIN — FAMOTIDINE 40 MG: 20 TABLET ORAL at 08:55

## 2022-02-27 RX ADMIN — Medication 10 ML: at 09:38

## 2022-02-27 RX ADMIN — FENTANYL CITRATE 25 MCG: 50 INJECTION INTRAMUSCULAR; INTRAVENOUS at 16:10

## 2022-02-27 RX ADMIN — LEVOTHYROXINE SODIUM 88 MCG: 88 TABLET ORAL at 08:55

## 2022-02-28 ENCOUNTER — ANESTHESIA EVENT (OUTPATIENT)
Dept: PERIOP | Facility: HOSPITAL | Age: 77
End: 2022-02-28

## 2022-02-28 ENCOUNTER — APPOINTMENT (OUTPATIENT)
Dept: GENERAL RADIOLOGY | Facility: HOSPITAL | Age: 77
End: 2022-02-28

## 2022-02-28 ENCOUNTER — ANESTHESIA (OUTPATIENT)
Dept: PERIOP | Facility: HOSPITAL | Age: 77
End: 2022-02-28

## 2022-02-28 PROBLEM — F05 DELIRIUM DUE TO ANOTHER MEDICAL CONDITION: Status: RESOLVED | Noted: 2022-02-27 | Resolved: 2022-02-28

## 2022-02-28 LAB
APTT PPP: 30.6 SECONDS (ref 24.3–38.1)
APTT PPP: 49.3 SECONDS (ref 24.3–38.1)
APTT PPP: 55.5 SECONDS (ref 24.3–38.1)
BASOPHILS # BLD AUTO: 0.06 10*3/MM3 (ref 0–0.2)
BASOPHILS NFR BLD AUTO: 0.7 % (ref 0–1.5)
DEPRECATED RDW RBC AUTO: 48.8 FL (ref 37–54)
EOSINOPHIL # BLD AUTO: 0.12 10*3/MM3 (ref 0–0.4)
EOSINOPHIL NFR BLD AUTO: 1.4 % (ref 0.3–6.2)
ERYTHROCYTE [DISTWIDTH] IN BLOOD BY AUTOMATED COUNT: 13.9 % (ref 12.3–15.4)
HCT VFR BLD AUTO: 40.9 % (ref 34–46.6)
HGB BLD-MCNC: 12.8 G/DL (ref 12–15.9)
IMM GRANULOCYTES # BLD AUTO: 0.08 10*3/MM3 (ref 0–0.05)
IMM GRANULOCYTES NFR BLD AUTO: 0.9 % (ref 0–0.5)
LYMPHOCYTES # BLD AUTO: 3.32 10*3/MM3 (ref 0.7–3.1)
LYMPHOCYTES NFR BLD AUTO: 38 % (ref 19.6–45.3)
MCH RBC QN AUTO: 29.8 PG (ref 26.6–33)
MCHC RBC AUTO-ENTMCNC: 31.3 G/DL (ref 31.5–35.7)
MCV RBC AUTO: 95.3 FL (ref 79–97)
MONOCYTES # BLD AUTO: 0.76 10*3/MM3 (ref 0.1–0.9)
MONOCYTES NFR BLD AUTO: 8.7 % (ref 5–12)
NEUTROPHILS NFR BLD AUTO: 4.39 10*3/MM3 (ref 1.7–7)
NEUTROPHILS NFR BLD AUTO: 50.3 % (ref 42.7–76)
NRBC BLD AUTO-RTO: 0 /100 WBC (ref 0–0.2)
PLATELET # BLD AUTO: 249 10*3/MM3 (ref 140–450)
PMV BLD AUTO: 12 FL (ref 6–12)
RBC # BLD AUTO: 4.29 10*6/MM3 (ref 3.77–5.28)
WBC NRBC COR # BLD: 8.73 10*3/MM3 (ref 3.4–10.8)

## 2022-02-28 PROCEDURE — 25010000002 ONDANSETRON PER 1 MG: Performed by: NURSE ANESTHETIST, CERTIFIED REGISTERED

## 2022-02-28 PROCEDURE — 63710000001 METOPROLOL SUCCINATE XL 50 MG TABLET SUSTAINED-RELEASE 24 HOUR: Performed by: SURGERY

## 2022-02-28 PROCEDURE — 25010000002 IOPAMIDOL 61 % SOLUTION: Performed by: SURGERY

## 2022-02-28 PROCEDURE — C1889 IMPLANT/INSERT DEVICE, NOC: HCPCS | Performed by: SURGERY

## 2022-02-28 PROCEDURE — 94799 UNLISTED PULMONARY SVC/PX: CPT

## 2022-02-28 PROCEDURE — 25010000002 HEPARIN (PORCINE) PER 1000 UNITS: Performed by: SURGERY

## 2022-02-28 PROCEDURE — 88304 TISSUE EXAM BY PATHOLOGIST: CPT | Performed by: SURGERY

## 2022-02-28 PROCEDURE — G0378 HOSPITAL OBSERVATION PER HR: HCPCS

## 2022-02-28 PROCEDURE — 94761 N-INVAS EAR/PLS OXIMETRY MLT: CPT

## 2022-02-28 PROCEDURE — 63710000001 HYDROCODONE-ACETAMINOPHEN 7.5-325 MG TABLET: Performed by: SURGERY

## 2022-02-28 PROCEDURE — 25010000002 PIPERACILLIN SOD-TAZOBACTAM PER 1 G: Performed by: STUDENT IN AN ORGANIZED HEALTH CARE EDUCATION/TRAINING PROGRAM

## 2022-02-28 PROCEDURE — 25010000002 FENTANYL CITRATE (PF) 50 MCG/ML SOLUTION: Performed by: INTERNAL MEDICINE

## 2022-02-28 PROCEDURE — A9270 NON-COVERED ITEM OR SERVICE: HCPCS | Performed by: SURGERY

## 2022-02-28 PROCEDURE — 99225 PR SBSQ OBSERVATION CARE/DAY 25 MINUTES: CPT | Performed by: INTERNAL MEDICINE

## 2022-02-28 PROCEDURE — 85025 COMPLETE CBC W/AUTO DIFF WBC: CPT | Performed by: INTERNAL MEDICINE

## 2022-02-28 PROCEDURE — 25010000002 FENTANYL CITRATE (PF) 50 MCG/ML SOLUTION: Performed by: SURGERY

## 2022-02-28 PROCEDURE — 47563 LAPARO CHOLECYSTECTOMY/GRAPH: CPT | Performed by: SURGERY

## 2022-02-28 PROCEDURE — 25010000002 PIPERACILLIN SOD-TAZOBACTAM PER 1 G: Performed by: SURGERY

## 2022-02-28 PROCEDURE — 96366 THER/PROPH/DIAG IV INF ADDON: CPT

## 2022-02-28 PROCEDURE — 63710000001 ATORVASTATIN 40 MG TABLET: Performed by: SURGERY

## 2022-02-28 PROCEDURE — 85730 THROMBOPLASTIN TIME PARTIAL: CPT | Performed by: INTERNAL MEDICINE

## 2022-02-28 PROCEDURE — 85730 THROMBOPLASTIN TIME PARTIAL: CPT | Performed by: SURGERY

## 2022-02-28 PROCEDURE — 25010000002 FENTANYL CITRATE (PF) 50 MCG/ML SOLUTION: Performed by: NURSE ANESTHETIST, CERTIFIED REGISTERED

## 2022-02-28 PROCEDURE — 25010000002 MIDAZOLAM PER 1MG: Performed by: NURSE ANESTHETIST, CERTIFIED REGISTERED

## 2022-02-28 PROCEDURE — 25010000002 DEXAMETHASONE PER 1 MG: Performed by: NURSE ANESTHETIST, CERTIFIED REGISTERED

## 2022-02-28 PROCEDURE — 25010000002 PROPOFOL 10 MG/ML EMULSION: Performed by: NURSE ANESTHETIST, CERTIFIED REGISTERED

## 2022-02-28 PROCEDURE — 74300 X-RAY BILE DUCTS/PANCREAS: CPT

## 2022-02-28 PROCEDURE — 25010000002 SUCCINYLCHOLINE PER 20 MG: Performed by: NURSE ANESTHETIST, CERTIFIED REGISTERED

## 2022-02-28 DEVICE — CLIP LIGAT VASC HORIZON TI MD/LG GRN 6CT: Type: IMPLANTABLE DEVICE | Site: ABDOMEN | Status: FUNCTIONAL

## 2022-02-28 RX ORDER — HYDROCODONE BITARTRATE AND ACETAMINOPHEN 5; 325 MG/1; MG/1
1 TABLET ORAL EVERY 4 HOURS PRN
Status: DISCONTINUED | OUTPATIENT
Start: 2022-02-28 | End: 2022-03-01 | Stop reason: HOSPADM

## 2022-02-28 RX ORDER — MIDAZOLAM HYDROCHLORIDE 2 MG/2ML
0.5 INJECTION, SOLUTION INTRAMUSCULAR; INTRAVENOUS
Status: DISCONTINUED | OUTPATIENT
Start: 2022-02-28 | End: 2022-02-28 | Stop reason: HOSPADM

## 2022-02-28 RX ORDER — DEXAMETHASONE SODIUM PHOSPHATE 4 MG/ML
8 INJECTION, SOLUTION INTRA-ARTICULAR; INTRALESIONAL; INTRAMUSCULAR; INTRAVENOUS; SOFT TISSUE ONCE
Status: COMPLETED | OUTPATIENT
Start: 2022-02-28 | End: 2022-02-28

## 2022-02-28 RX ORDER — MAGNESIUM HYDROXIDE 1200 MG/15ML
LIQUID ORAL AS NEEDED
Status: DISCONTINUED | OUTPATIENT
Start: 2022-02-28 | End: 2022-02-28 | Stop reason: HOSPADM

## 2022-02-28 RX ORDER — ACETAMINOPHEN 325 MG/1
650 TABLET ORAL EVERY 4 HOURS PRN
Status: DISCONTINUED | OUTPATIENT
Start: 2022-02-28 | End: 2022-03-01 | Stop reason: HOSPADM

## 2022-02-28 RX ORDER — DEXTROSE, SODIUM CHLORIDE, AND POTASSIUM CHLORIDE 5; .45; .15 G/100ML; G/100ML; G/100ML
50 INJECTION INTRAVENOUS CONTINUOUS
Status: DISCONTINUED | OUTPATIENT
Start: 2022-02-28 | End: 2022-02-28

## 2022-02-28 RX ORDER — EPHEDRINE SULFATE 50 MG/ML
INJECTION, SOLUTION INTRAVENOUS AS NEEDED
Status: DISCONTINUED | OUTPATIENT
Start: 2022-02-28 | End: 2022-02-28 | Stop reason: SURG

## 2022-02-28 RX ORDER — ACETAMINOPHEN 650 MG/1
650 SUPPOSITORY RECTAL EVERY 4 HOURS PRN
Status: DISCONTINUED | OUTPATIENT
Start: 2022-02-28 | End: 2022-03-01 | Stop reason: HOSPADM

## 2022-02-28 RX ORDER — PROPOFOL 10 MG/ML
VIAL (ML) INTRAVENOUS AS NEEDED
Status: DISCONTINUED | OUTPATIENT
Start: 2022-02-28 | End: 2022-02-28 | Stop reason: SURG

## 2022-02-28 RX ORDER — SUCCINYLCHOLINE CHLORIDE 20 MG/ML
INJECTION INTRAMUSCULAR; INTRAVENOUS AS NEEDED
Status: DISCONTINUED | OUTPATIENT
Start: 2022-02-28 | End: 2022-02-28 | Stop reason: SURG

## 2022-02-28 RX ORDER — HYDROCODONE BITARTRATE AND ACETAMINOPHEN 5; 325 MG/1; MG/1
1 TABLET ORAL ONCE AS NEEDED
Status: DISCONTINUED | OUTPATIENT
Start: 2022-02-28 | End: 2022-02-28 | Stop reason: HOSPADM

## 2022-02-28 RX ORDER — FENTANYL CITRATE 50 UG/ML
25 INJECTION, SOLUTION INTRAMUSCULAR; INTRAVENOUS
Status: DISCONTINUED | OUTPATIENT
Start: 2022-02-28 | End: 2022-03-01 | Stop reason: HOSPADM

## 2022-02-28 RX ORDER — LIDOCAINE HYDROCHLORIDE 20 MG/ML
INJECTION, SOLUTION INFILTRATION; PERINEURAL AS NEEDED
Status: DISCONTINUED | OUTPATIENT
Start: 2022-02-28 | End: 2022-02-28 | Stop reason: SURG

## 2022-02-28 RX ORDER — FENTANYL CITRATE 50 UG/ML
25 INJECTION, SOLUTION INTRAMUSCULAR; INTRAVENOUS
Status: DISCONTINUED | OUTPATIENT
Start: 2022-02-28 | End: 2022-02-28 | Stop reason: HOSPADM

## 2022-02-28 RX ORDER — ONDANSETRON 2 MG/ML
4 INJECTION INTRAMUSCULAR; INTRAVENOUS ONCE AS NEEDED
Status: COMPLETED | OUTPATIENT
Start: 2022-02-28 | End: 2022-02-28

## 2022-02-28 RX ORDER — SODIUM CHLORIDE, SODIUM LACTATE, POTASSIUM CHLORIDE, CALCIUM CHLORIDE 600; 310; 30; 20 MG/100ML; MG/100ML; MG/100ML; MG/100ML
100 INJECTION, SOLUTION INTRAVENOUS CONTINUOUS
Status: DISCONTINUED | OUTPATIENT
Start: 2022-02-28 | End: 2022-03-01

## 2022-02-28 RX ORDER — HEPARIN SOD,PORCINE/0.9 % NACL 25000/250
9 INTRAVENOUS SOLUTION INTRAVENOUS
Status: DISCONTINUED | OUTPATIENT
Start: 2022-02-28 | End: 2022-03-01

## 2022-02-28 RX ORDER — ONDANSETRON 2 MG/ML
4 INJECTION INTRAMUSCULAR; INTRAVENOUS 4 TIMES DAILY PRN
Status: DISCONTINUED | OUTPATIENT
Start: 2022-02-28 | End: 2022-03-01 | Stop reason: HOSPADM

## 2022-02-28 RX ORDER — HYDROCODONE BITARTRATE AND ACETAMINOPHEN 7.5; 325 MG/1; MG/1
1 TABLET ORAL EVERY 4 HOURS PRN
Status: DISCONTINUED | OUTPATIENT
Start: 2022-02-28 | End: 2022-03-01 | Stop reason: HOSPADM

## 2022-02-28 RX ORDER — ROCURONIUM BROMIDE 10 MG/ML
INJECTION, SOLUTION INTRAVENOUS AS NEEDED
Status: DISCONTINUED | OUTPATIENT
Start: 2022-02-28 | End: 2022-02-28 | Stop reason: SURG

## 2022-02-28 RX ORDER — ONDANSETRON 4 MG/1
4 TABLET, FILM COATED ORAL 4 TIMES DAILY PRN
Status: DISCONTINUED | OUTPATIENT
Start: 2022-02-28 | End: 2022-03-01 | Stop reason: HOSPADM

## 2022-02-28 RX ORDER — FAMOTIDINE 10 MG/ML
20 INJECTION, SOLUTION INTRAVENOUS
Status: DISCONTINUED | OUTPATIENT
Start: 2022-02-28 | End: 2022-02-28

## 2022-02-28 RX ORDER — HYDROMORPHONE HCL 110MG/55ML
0.5 PATIENT CONTROLLED ANALGESIA SYRINGE INTRAVENOUS
Status: DISCONTINUED | OUTPATIENT
Start: 2022-02-28 | End: 2022-03-01 | Stop reason: HOSPADM

## 2022-02-28 RX ORDER — BUPIVACAINE HYDROCHLORIDE AND EPINEPHRINE 2.5; 5 MG/ML; UG/ML
INJECTION, SOLUTION INFILTRATION; PERINEURAL AS NEEDED
Status: DISCONTINUED | OUTPATIENT
Start: 2022-02-28 | End: 2022-02-28 | Stop reason: HOSPADM

## 2022-02-28 RX ORDER — NALOXONE HCL 0.4 MG/ML
0.1 VIAL (ML) INJECTION
Status: DISCONTINUED | OUTPATIENT
Start: 2022-02-28 | End: 2022-03-01 | Stop reason: HOSPADM

## 2022-02-28 RX ORDER — SODIUM CHLORIDE, SODIUM LACTATE, POTASSIUM CHLORIDE, CALCIUM CHLORIDE 600; 310; 30; 20 MG/100ML; MG/100ML; MG/100ML; MG/100ML
9 INJECTION, SOLUTION INTRAVENOUS CONTINUOUS
Status: DISCONTINUED | OUTPATIENT
Start: 2022-02-28 | End: 2022-02-28

## 2022-02-28 RX ORDER — ONDANSETRON 2 MG/ML
4 INJECTION INTRAMUSCULAR; INTRAVENOUS ONCE AS NEEDED
Status: DISCONTINUED | OUTPATIENT
Start: 2022-02-28 | End: 2022-02-28 | Stop reason: HOSPADM

## 2022-02-28 RX ORDER — SODIUM CHLORIDE 9 MG/ML
INJECTION, SOLUTION INTRAVENOUS AS NEEDED
Status: DISCONTINUED | OUTPATIENT
Start: 2022-02-28 | End: 2022-02-28 | Stop reason: HOSPADM

## 2022-02-28 RX ADMIN — AMLODIPINE BESYLATE 5 MG: 5 TABLET ORAL at 08:19

## 2022-02-28 RX ADMIN — FENTANYL CITRATE 25 MCG: 50 INJECTION INTRAMUSCULAR; INTRAVENOUS at 13:49

## 2022-02-28 RX ADMIN — LEVOTHYROXINE SODIUM 88 MCG: 88 TABLET ORAL at 08:19

## 2022-02-28 RX ADMIN — SODIUM CHLORIDE 3.38 G: 900 INJECTION, SOLUTION INTRAVENOUS at 08:19

## 2022-02-28 RX ADMIN — DEXAMETHASONE SODIUM PHOSPHATE 8 MG: 4 INJECTION, SOLUTION INTRAMUSCULAR; INTRAVENOUS at 13:04

## 2022-02-28 RX ADMIN — CITALOPRAM HYDROBROMIDE 20 MG: 20 TABLET ORAL at 08:19

## 2022-02-28 RX ADMIN — LIDOCAINE HYDROCHLORIDE 100 MG: 20 INJECTION, SOLUTION INFILTRATION; PERINEURAL at 13:28

## 2022-02-28 RX ADMIN — Medication 10 ML: at 20:13

## 2022-02-28 RX ADMIN — FENTANYL CITRATE 25 MCG: 50 INJECTION INTRAMUSCULAR; INTRAVENOUS at 20:13

## 2022-02-28 RX ADMIN — SUGAMMADEX 200 MG: 100 INJECTION, SOLUTION INTRAVENOUS at 14:20

## 2022-02-28 RX ADMIN — FAMOTIDINE 40 MG: 20 TABLET ORAL at 08:19

## 2022-02-28 RX ADMIN — Medication 10 ML: at 08:20

## 2022-02-28 RX ADMIN — ATORVASTATIN CALCIUM 40 MG: 40 TABLET, FILM COATED ORAL at 20:12

## 2022-02-28 RX ADMIN — EPHEDRINE SULFATE 10 MG: 50 INJECTION, SOLUTION INTRAVENOUS at 13:33

## 2022-02-28 RX ADMIN — METOPROLOL SUCCINATE 50 MG: 50 TABLET, EXTENDED RELEASE ORAL at 08:19

## 2022-02-28 RX ADMIN — PANTOPRAZOLE SODIUM 40 MG: 40 TABLET, DELAYED RELEASE ORAL at 06:01

## 2022-02-28 RX ADMIN — SODIUM CHLORIDE 3.38 G: 900 INJECTION, SOLUTION INTRAVENOUS at 17:00

## 2022-02-28 RX ADMIN — HEPARIN SODIUM 9 UNITS/KG/HR: 5000 INJECTION, SOLUTION INTRAVENOUS; SUBCUTANEOUS at 17:26

## 2022-02-28 RX ADMIN — PROPOFOL 150 MG: 10 INJECTION, EMULSION INTRAVENOUS at 13:31

## 2022-02-28 RX ADMIN — SODIUM CHLORIDE, POTASSIUM CHLORIDE, SODIUM LACTATE AND CALCIUM CHLORIDE 100 ML/HR: 600; 310; 30; 20 INJECTION, SOLUTION INTRAVENOUS at 17:25

## 2022-02-28 RX ADMIN — HYDROCODONE BITARTRATE AND ACETAMINOPHEN 1 TABLET: 7.5; 325 TABLET ORAL at 17:53

## 2022-02-28 RX ADMIN — ONDANSETRON 4 MG: 2 INJECTION INTRAMUSCULAR; INTRAVENOUS at 13:04

## 2022-02-28 RX ADMIN — METOPROLOL SUCCINATE 50 MG: 50 TABLET, EXTENDED RELEASE ORAL at 20:12

## 2022-02-28 RX ADMIN — FENTANYL CITRATE 25 MCG: 50 INJECTION, SOLUTION INTRAMUSCULAR; INTRAVENOUS at 15:29

## 2022-02-28 RX ADMIN — ROCURONIUM BROMIDE 15 MG: 10 INJECTION INTRAVENOUS at 13:46

## 2022-02-28 RX ADMIN — MIDAZOLAM HYDROCHLORIDE 0.5 MG: 1 INJECTION, SOLUTION INTRAMUSCULAR; INTRAVENOUS at 13:20

## 2022-02-28 RX ADMIN — FENTANYL CITRATE 25 MCG: 50 INJECTION, SOLUTION INTRAMUSCULAR; INTRAVENOUS at 15:19

## 2022-02-28 RX ADMIN — SODIUM CHLORIDE 3.38 G: 900 INJECTION, SOLUTION INTRAVENOUS at 01:00

## 2022-02-28 RX ADMIN — SODIUM CHLORIDE, POTASSIUM CHLORIDE, SODIUM LACTATE AND CALCIUM CHLORIDE: 600; 310; 30; 20 INJECTION, SOLUTION INTRAVENOUS at 13:23

## 2022-02-28 RX ADMIN — SUGAMMADEX 60 MG: 100 INJECTION, SOLUTION INTRAVENOUS at 14:33

## 2022-02-28 RX ADMIN — SUCCINYLCHOLINE CHLORIDE 200 MG: 20 INJECTION, SOLUTION INTRAMUSCULAR; INTRAVENOUS at 13:31

## 2022-02-28 NOTE — ANESTHESIA PREPROCEDURE EVALUATION
Anesthesia Evaluation     Patient summary reviewed and Nursing notes reviewed   history of anesthetic complications: prolonged sedation  NPO Solid Status: > 8 hours  NPO Liquid Status: > 8 hours           Airway   Mallampati: II  TM distance: >3 FB  Neck ROM: full  No difficulty expected  Dental    (+) upper dentures    Pulmonary     breath sounds clear to auscultation  (+) pulmonary embolism (found this saturday, placed on eliquis and briged with heparin today for surgery), a smoker (quit 1999) Former,   Sleep apnea: pt thinks she has it not diagnosed   Cardiovascular   Exercise tolerance: good (4-7 METS)    ECG reviewed  PT is on anticoagulation therapy  Patient on routine beta blocker and Beta blocker given within 24 hours of surgery  Rhythm: regular  Rate: normal    (+) hypertension well controlled less than 2 medications, DVT (LLE) resolved, hyperlipidemia,     ROS comment: Narrative & Impression      HEART RATE= 69  bpm  RR Interval= 872  ms  CA Interval= 171  ms  P Horizontal Axis= -83  deg  P Front Axis= 68  deg  QRSD Interval= 86  ms  QT Interval= 439  ms  QRS Axis= -3  deg  T Wave Axis= -28  deg  - ABNORMAL ECG -  Sinus rhythm  Borderline ST depression, lateral leads  Abnormal T, consider ischemia, diffuse leads  Electronically Signed By:   Date and Time of Study: 2022-02-26 16:13:14    Specimen Collected: 02/26/22 16:13 Last Resulted: 02/26/22 16:13      Interpretation Summary    · Myocardial perfusion imaging indicates a normal myocardial perfusion study with no evidence of ischemia.  · Left ventricular ejection fraction is normal (Calculated EF = 61%).  · Impressions are consistent with a low risk study.        Neuro/Psych  GI/Hepatic/Renal/Endo    (+)  GERD well controlled,  renal disease stones, thyroid problem hypothyroidism    Musculoskeletal     (+) back pain, chronic pain,       ROS comment: DDD  Abdominal    Substance History      OB/GYN          Other   arthritis,                       Anesthesia Plan    ASA 3     general     intravenous induction     Anesthetic plan, all risks, benefits, and alternatives have been provided, discussed and informed consent has been obtained with: patient.  Use of blood products discussed with patient  Consented to blood products.       CODE STATUS:    Code Status (Patient has no pulse and is not breathing): CPR (Attempt to Resuscitate)  Medical Interventions (Patient has pulse or is breathing): Full Support

## 2022-02-28 NOTE — ANESTHESIA PROCEDURE NOTES
Airway  Urgency: elective    Date/Time: 2/28/2022 1:32 PM  End Time:2/28/2022 1:32 PM  Airway not difficult    General Information and Staff    Patient location during procedure: OR  CRNA: Angela Tellez CRNA    Indications and Patient Condition  Indications for airway management: airway protection    Preoxygenated: yes  MILS maintained throughout  Mask difficulty assessment: 0 - not attempted (RSI)    Final Airway Details  Final airway type: endotracheal airway      Successful airway: ETT  Cuffed: yes   Successful intubation technique: direct laryngoscopy  Facilitating devices/methods: intubating stylet and cricoid pressure  Endotracheal tube insertion site: oral  Blade: Queen  Blade size: 2  ETT size (mm): 7.5  Cormack-Lehane Classification: grade I - full view of glottis  Placement verified by: chest auscultation and capnometry   Measured from: lips  ETT/EBT  to lips (cm): 18  Number of attempts at approach: 1  Assessment: lips, teeth, and gum same as pre-op and atraumatic intubation

## 2022-02-28 NOTE — ANESTHESIA POSTPROCEDURE EVALUATION
Patient: Janna Moy    Procedure Summary     Date: 02/28/22 Room / Location:  [facility] OR 3 /  [facility] OR    Anesthesia Start: 1323 Anesthesia Stop: 1446    Procedure: CHOLECYSTECTOMY LAPAROSCOPIC INTRAOPERATIVE CHOLANGIOGRAM (N/A Abdomen) Diagnosis:       Acute cholecystitis      (Acute cholecystitis [K81.0])    Surgeons: Ijeoma Becker MD Provider: Angela Tellez CRNA    Anesthesia Type: general ASA Status: 3          Anesthesia Type: general    Vitals  Vitals Value Taken Time   /69 02/28/22 1535   Temp 98 °F (36.7 °C) 02/28/22 1441   Pulse 75 02/28/22 1539   Resp 14 02/28/22 1535   SpO2 92 % 02/28/22 1540   Vitals shown include unvalidated device data.        Post Anesthesia Care and Evaluation    Patient location during evaluation: bedside  Patient participation: complete - patient participated  Level of consciousness: awake and alert  Pain score: 3  Pain management: adequate  Airway patency: patent  Anesthetic complications: No anesthetic complications  PONV Status: none  Cardiovascular status: acceptable  Respiratory status: acceptable  Hydration status: acceptable  No anesthesia care post op

## 2022-03-01 VITALS
HEIGHT: 62 IN | DIASTOLIC BLOOD PRESSURE: 77 MMHG | SYSTOLIC BLOOD PRESSURE: 169 MMHG | HEART RATE: 66 BPM | RESPIRATION RATE: 18 BRPM | WEIGHT: 147.3 LBS | OXYGEN SATURATION: 96 % | TEMPERATURE: 97.9 F | BODY MASS INDEX: 27.1 KG/M2

## 2022-03-01 PROBLEM — K81.0 ACUTE CHOLECYSTITIS: Status: RESOLVED | Noted: 2022-02-27 | Resolved: 2022-03-01

## 2022-03-01 LAB
ANION GAP SERPL CALCULATED.3IONS-SCNC: 9.7 MMOL/L (ref 5–15)
APTT PPP: 46 SECONDS (ref 24.3–38.1)
APTT PPP: 93.1 SECONDS (ref 24.3–38.1)
BASOPHILS # BLD AUTO: 0.04 10*3/MM3 (ref 0–0.2)
BASOPHILS NFR BLD AUTO: 0.3 % (ref 0–1.5)
BUN SERPL-MCNC: 21 MG/DL (ref 8–23)
BUN/CREAT SERPL: 15.6 (ref 7–25)
CALCIUM SPEC-SCNC: 9 MG/DL (ref 8.6–10.5)
CHLORIDE SERPL-SCNC: 102 MMOL/L (ref 98–107)
CO2 SERPL-SCNC: 24.3 MMOL/L (ref 22–29)
CREAT SERPL-MCNC: 1.35 MG/DL (ref 0.57–1)
DEPRECATED RDW RBC AUTO: 49.4 FL (ref 37–54)
EGFRCR SERPLBLD CKD-EPI 2021: 40.6 ML/MIN/1.73
EOSINOPHIL # BLD AUTO: 0 10*3/MM3 (ref 0–0.4)
EOSINOPHIL NFR BLD AUTO: 0 % (ref 0.3–6.2)
ERYTHROCYTE [DISTWIDTH] IN BLOOD BY AUTOMATED COUNT: 13.7 % (ref 12.3–15.4)
GLUCOSE SERPL-MCNC: 146 MG/DL (ref 65–99)
HCT VFR BLD AUTO: 39.9 % (ref 34–46.6)
HGB BLD-MCNC: 12.2 G/DL (ref 12–15.9)
IMM GRANULOCYTES # BLD AUTO: 0.07 10*3/MM3 (ref 0–0.05)
IMM GRANULOCYTES NFR BLD AUTO: 0.5 % (ref 0–0.5)
LYMPHOCYTES # BLD AUTO: 1.42 10*3/MM3 (ref 0.7–3.1)
LYMPHOCYTES NFR BLD AUTO: 10.3 % (ref 19.6–45.3)
MCH RBC QN AUTO: 29.9 PG (ref 26.6–33)
MCHC RBC AUTO-ENTMCNC: 30.6 G/DL (ref 31.5–35.7)
MCV RBC AUTO: 97.8 FL (ref 79–97)
MONOCYTES # BLD AUTO: 0.73 10*3/MM3 (ref 0.1–0.9)
MONOCYTES NFR BLD AUTO: 5.3 % (ref 5–12)
NEUTROPHILS NFR BLD AUTO: 11.52 10*3/MM3 (ref 1.7–7)
NEUTROPHILS NFR BLD AUTO: 83.6 % (ref 42.7–76)
NRBC BLD AUTO-RTO: 0 /100 WBC (ref 0–0.2)
PLATELET # BLD AUTO: 203 10*3/MM3 (ref 140–450)
PMV BLD AUTO: 12.1 FL (ref 6–12)
POTASSIUM SERPL-SCNC: 4.2 MMOL/L (ref 3.5–5.2)
QT INTERVAL: 439 MS
RBC # BLD AUTO: 4.08 10*6/MM3 (ref 3.77–5.28)
SODIUM SERPL-SCNC: 136 MMOL/L (ref 136–145)
WBC NRBC COR # BLD: 13.78 10*3/MM3 (ref 3.4–10.8)

## 2022-03-01 PROCEDURE — 63710000001 FAMOTIDINE 20 MG TABLET: Performed by: SURGERY

## 2022-03-01 PROCEDURE — A9270 NON-COVERED ITEM OR SERVICE: HCPCS | Performed by: SURGERY

## 2022-03-01 PROCEDURE — 63710000001 METOPROLOL SUCCINATE XL 50 MG TABLET SUSTAINED-RELEASE 24 HOUR: Performed by: SURGERY

## 2022-03-01 PROCEDURE — A9270 NON-COVERED ITEM OR SERVICE: HCPCS | Performed by: INTERNAL MEDICINE

## 2022-03-01 PROCEDURE — 85730 THROMBOPLASTIN TIME PARTIAL: CPT | Performed by: INTERNAL MEDICINE

## 2022-03-01 PROCEDURE — 25010000002 HEPARIN (PORCINE) PER 1000 UNITS: Performed by: SURGERY

## 2022-03-01 PROCEDURE — 63710000001 PANTOPRAZOLE 40 MG TABLET DELAYED-RELEASE: Performed by: SURGERY

## 2022-03-01 PROCEDURE — 63710000001 AMLODIPINE 5 MG TABLET: Performed by: SURGERY

## 2022-03-01 PROCEDURE — 99217 PR OBSERVATION CARE DISCHARGE MANAGEMENT: CPT | Performed by: INTERNAL MEDICINE

## 2022-03-01 PROCEDURE — 63710000001 HYDROCODONE-ACETAMINOPHEN 7.5-325 MG TABLET: Performed by: SURGERY

## 2022-03-01 PROCEDURE — 94799 UNLISTED PULMONARY SVC/PX: CPT

## 2022-03-01 PROCEDURE — 63710000001 LEVOTHYROXINE 88 MCG TABLET: Performed by: SURGERY

## 2022-03-01 PROCEDURE — 63710000001 CITALOPRAM 20 MG TABLET: Performed by: SURGERY

## 2022-03-01 PROCEDURE — 80048 BASIC METABOLIC PNL TOTAL CA: CPT | Performed by: SURGERY

## 2022-03-01 PROCEDURE — 25010000002 PIPERACILLIN SOD-TAZOBACTAM PER 1 G: Performed by: SURGERY

## 2022-03-01 PROCEDURE — 63710000001 APIXABAN 2.5 MG TABLET: Performed by: INTERNAL MEDICINE

## 2022-03-01 PROCEDURE — 85025 COMPLETE CBC W/AUTO DIFF WBC: CPT | Performed by: SURGERY

## 2022-03-01 PROCEDURE — 63710000001 HYDROCODONE-ACETAMINOPHEN 5-325 MG TABLET: Performed by: SURGERY

## 2022-03-01 PROCEDURE — G0378 HOSPITAL OBSERVATION PER HR: HCPCS

## 2022-03-01 RX ORDER — AMLODIPINE BESYLATE 5 MG/1
5 TABLET ORAL
Qty: 30 TABLET | Refills: 0 | Status: SHIPPED | OUTPATIENT
Start: 2022-03-01 | End: 2022-07-19 | Stop reason: ALTCHOICE

## 2022-03-01 RX ORDER — ONDANSETRON 4 MG/1
4 TABLET, FILM COATED ORAL 4 TIMES DAILY PRN
Qty: 10 TABLET | Refills: 0 | Status: SHIPPED | OUTPATIENT
Start: 2022-03-01

## 2022-03-01 RX ADMIN — SODIUM CHLORIDE 3.38 G: 900 INJECTION, SOLUTION INTRAVENOUS at 09:56

## 2022-03-01 RX ADMIN — SODIUM CHLORIDE, POTASSIUM CHLORIDE, SODIUM LACTATE AND CALCIUM CHLORIDE 100 ML/HR: 600; 310; 30; 20 INJECTION, SOLUTION INTRAVENOUS at 05:32

## 2022-03-01 RX ADMIN — PANTOPRAZOLE SODIUM 40 MG: 40 TABLET, DELAYED RELEASE ORAL at 05:32

## 2022-03-01 RX ADMIN — AMLODIPINE BESYLATE 5 MG: 5 TABLET ORAL at 09:50

## 2022-03-01 RX ADMIN — Medication 10 ML: at 09:51

## 2022-03-01 RX ADMIN — APIXABAN 5 MG: 2.5 TABLET, FILM COATED ORAL at 09:56

## 2022-03-01 RX ADMIN — HYDROCODONE BITARTRATE AND ACETAMINOPHEN 1 TABLET: 7.5; 325 TABLET ORAL at 13:10

## 2022-03-01 RX ADMIN — FAMOTIDINE 40 MG: 20 TABLET ORAL at 09:50

## 2022-03-01 RX ADMIN — HYDROCODONE BITARTRATE AND ACETAMINOPHEN 1 TABLET: 5; 325 TABLET ORAL at 01:04

## 2022-03-01 RX ADMIN — CITALOPRAM HYDROBROMIDE 20 MG: 20 TABLET ORAL at 09:51

## 2022-03-01 RX ADMIN — METOPROLOL SUCCINATE 50 MG: 50 TABLET, EXTENDED RELEASE ORAL at 09:51

## 2022-03-01 RX ADMIN — HEPARIN SODIUM 2000 UNITS: 5000 INJECTION, SOLUTION INTRAVENOUS; SUBCUTANEOUS at 00:44

## 2022-03-01 RX ADMIN — SODIUM CHLORIDE 3.38 G: 900 INJECTION, SOLUTION INTRAVENOUS at 00:50

## 2022-03-01 RX ADMIN — LEVOTHYROXINE SODIUM 88 MCG: 88 TABLET ORAL at 09:50

## 2022-03-01 NOTE — CASE MANAGEMENT/SOCIAL WORK
Continued Stay Note  REBEKAH Juan     Patient Name: Janna Moy  MRN: [id number]  Today's Date: 3/1/2022    Admit Date: 2/26/2022     Discharge Plan     Row Name 03/01/22 1147       Plan    Plan Comments I spoke with the patient in her room.  She was sitting up in the recliner watching TV.  I asked her about her discharge plans and she advised that her daughter will be picking her up and she was going home with her .  She also commented that she was waiting to be discharged and would see the surgeon prior to leaving.  CM will continue to follow.               Discharge Codes    No documentation.               Expected Discharge Date and Time     Expected Discharge Date Expected Discharge Time    Mar 1, 2022             Polly Donato RN

## 2022-03-01 NOTE — PROGRESS NOTES
"SERVICE: [facility] HOSPITALIST    CONSULTANTS: General Surgery     CHIEF COMPLAINT: Abdominal pain    SUBJECTIVE: Patient seen and examined at bedside.  Patient reports he is doing very well after surgery, has minimal abdominal pain at incision site.  She reports tolerating liquid diet well and request advancing her diet.  She reports she would like to go home today.   patient denies headache, fever or chills, nausea, vomiting, diarrhea, chest pain or palpitations, shortness of breath, wheezing or coughing, leg pain or weakness.     OBJECTIVE:    Physical exam is largely unchanged from previous exam, except where documented below, examination is accurate as of 3/1/2022    Physical Exam:  General: Patient is awake and alert.  Elderly female. No acute distress noted.  Alert and oriented x3.  HENT: Head is atraumatic, normocephalic. Hearing is grossly intact. Nose is without obvious congestion and appears patent. Neck is supple and trachea is midline.   Eyes: Vision is grossly intact. Pupils appear equal and round.   Cardiovascular: Heart has regular rate and rhythm with no murmurs, rubs or gallops noted.   Respiratory: Lungs are clear to ausculation without wheezes, rhonchi or rales.   Abdominal/GI: Soft, abdominal incisions clean dry and intact, mild tenderness around these areas, bowel sounds present. No rebound or guarding present.   Extremities: No peripheral edema noted.   Musculoskeletal: Spontaneous movement of bilateral upper and lower extremities against gravity noted. No signs of injury or deformity noted.   Skin: Warm and dry.   Psych: Mood and affect are appropriate. Cooperative with exam.   Neuro: No facial asymmetry noted. No focal deficits noted, hearing and vision are grossly intact.     /69 (BP Location: Left arm, Patient Position: Lying)   Pulse 63   Temp 97.7 °F (36.5 °C) (Oral)   Resp 15   Ht 157.5 cm (62\")   Wt 66.8 kg (147 lb 4.8 oz)   SpO2 95%   BMI 26.94 kg/m² "     MEDS/LABS REVIEWED AND ORDERED    amLODIPine, 5 mg, Oral, Q24H  atorvastatin, 40 mg, Oral, Nightly  citalopram, 20 mg, Oral, Daily  famotidine, 40 mg, Oral, Daily  levothyroxine, 88 mcg, Oral, Daily  metoprolol succinate XL, 50 mg, Oral, BID  pantoprazole, 40 mg, Oral, QAM  piperacillin-tazobactam, 3.375 g, Intravenous, Once  piperacillin-tazobactam, 3.375 g, Intravenous, Q8H  sodium chloride, 10 mL, Intravenous, Q12H          LAB/DIAGNOSTICS:    Lab Results (last 24 hours)     Procedure Component Value Units Date/Time    Basic Metabolic Panel [[id number]]  (Abnormal) Collected: 03/01/22 0448    Specimen: Blood Updated: 03/01/22 0527     Glucose 146 mg/dL      BUN 21 mg/dL      Creatinine 1.35 mg/dL      Sodium 136 mmol/L      Potassium 4.2 mmol/L      Chloride 102 mmol/L      CO2 24.3 mmol/L      Calcium 9.0 mg/dL      BUN/Creatinine Ratio 15.6     Anion Gap 9.7 mmol/L      eGFR 40.6 mL/min/1.73      Comment: National Kidney Foundation and American Society of Nephrology (ASN) Task Force recommended calculation based on the Chronic Kidney Disease Epidemiology Collaboration (CKD-EPI) equation refit without adjustment for race.       Narrative:      GFR Normal >60  Chronic Kidney Disease <60  Kidney Failure <15      aPTT [[id number]]  (Abnormal) Collected: 03/01/22 0448    Specimen: Blood Updated: 03/01/22 0508     PTT 93.1 seconds     Narrative:      PTT = The equivalent PTT values for the therapeutic range of heparin levels at 0.1 to 0.7 U/ml are 53 to 110 seconds.      CBC & Differential [[id number]]  (Abnormal) Collected: 03/01/22 0448    Specimen: Blood Updated: 03/01/22 0502    Narrative:      The following orders were created for panel order CBC & Differential.  Procedure                               Abnormality         Status                     ---------                               -----------         ------                     CBC Auto Differential[[id number]]        Abnormal            Final result                  Please view results for these tests on the individual orders.    CBC Auto Differential [[id number]]  (Abnormal) Collected: 03/01/22 0448    Specimen: Blood Updated: 03/01/22 0502     WBC 13.78 10*3/mm3      RBC 4.08 10*6/mm3      Hemoglobin 12.2 g/dL      Hematocrit 39.9 %      MCV 97.8 fL      MCH 29.9 pg      MCHC 30.6 g/dL      RDW 13.7 %      RDW-SD 49.4 fl      MPV 12.1 fL      Platelets 203 10*3/mm3      Neutrophil % 83.6 %      Lymphocyte % 10.3 %      Monocyte % 5.3 %      Eosinophil % 0.0 %      Basophil % 0.3 %      Immature Grans % 0.5 %      Neutrophils, Absolute 11.52 10*3/mm3      Lymphocytes, Absolute 1.42 10*3/mm3      Monocytes, Absolute 0.73 10*3/mm3      Eosinophils, Absolute 0.00 10*3/mm3      Basophils, Absolute 0.04 10*3/mm3      Immature Grans, Absolute 0.07 10*3/mm3      nRBC 0.0 /100 WBC     aPTT [[id number]]  (Abnormal) Collected: 02/28/22 2255    Specimen: Blood Updated: 02/28/22 2316     PTT 49.3 seconds     Narrative:      PTT = The equivalent PTT values for the therapeutic range of heparin levels at 0.1 to 0.7 U/ml are 53 to 110 seconds.      aPTT [[id number]]  (Normal) Collected: 02/28/22 1632    Specimen: Blood Updated: 02/28/22 1707     PTT 30.6 seconds     Narrative:      PTT = The equivalent PTT values for the therapeutic range of heparin levels at 0.1 to 0.7 U/ml are 53 to 110 seconds.      Tissue Pathology Exam [[id number]] Collected: 02/28/22 1358    Specimen: Tissue from Gallbladder Updated: 02/28/22 1436        ECG 12 Lead   Preliminary Result   HEART RATE= 69  bpm   RR Interval= 872  ms   [place] Interval= 171  ms   P Horizontal Axis= -83  deg   P Front Axis= 68  deg   QRSD Interval= 86  ms   QT Interval= 439  ms   QRS Axis= -3  deg   T Wave Axis= -28  deg   - ABNORMAL ECG -   Sinus rhythm   Borderline ST depression, lateral leads   Abnormal T, consider ischemia, diffuse leads   Electronically Signed By:    Date and Time of Study: 2022-02-26 16:13:14          [place]  Cholangiogram Operative    Result Date: 2/28/2022  Negative intraoperative cholangiogram.  This report was finalized on 2/28/2022 2:51 PM by Dr. Harpreet Nova MD.          ASSESSMENT/PLAN:  Please not portions of this assessment/plan may have been copied and pasted, but I have personally seen this patient and reviewed each line of this assessment and plan for accuracy and made updates to reflect my necessary changes on 3/1/2022    Pain induced delirium from acute cholecysitis  -Delirium resolved. Continues with normal mentation.   -She continues on Zosyn, Surgery has performed laparoscopic cholecystectomy on 2/28/2022.  -Eliquis resumed  -We will advance diet    Pulmonary emboli and left lower lobe found on previous admission-resume Eliquis     CKD stage III-creatinine is at baseline, now with slight improvement.      Cervical degenerative disc disease-continue baclofen     Hypothyroidism-TSH normal. continue home levothyroxine     Major depressive disorder continue citalopram from home     GERD continue Pepcid and Protonix     DVT PPX: Eliquis    PLAN FOR DISPOSITION: MAC Jiang DO  Hospitalist, [facility]  03/01/22  07:48 EST    As of April 2021, as required by the Federal 21st Century Cures Act, medical records (including provider notes and laboratory/imaging results) are to be made available to patients and/or their designees as soon as the documents are signed/resulted. While the intention is to ensure transparency and to engage patients in their healthcare, this immediate access may create unintended consequences because this document uses language intended for communication between medical providers for interpretation with the entirety of the patient's clinical picture in mind. It is recommended that patients and/or their designees review all available information with their primary or specialist providers for explanation and to avoid misinterpretation of this  "information.    \"Dictated utilizing Dragon dictation\"    "

## 2022-03-01 NOTE — DISCHARGE SUMMARY
Janna Moy  1945  [id number]    Hospitalists Discharge Summary    Date of Admission: 2/26/2022  Date of Discharge:  3/1/2022    History of Present Illness from hospitals on admit: Patient with history of DVT and PE, GERD, hyperlipidemia, hypertension and hypothyroidism.  She was recently discharged from our facility after being found to have left left-sided PE and was discharged on Eliquis.  Today she reports to the ED and was brought in by her .  Due to her currently altered mental status information contained below was obtained from ED documentation, chart review.  Her  stated in the ER that he last saw her normal at 7 PM last night prior to her going to bed.  He then thought today that she was just sleeping in a when he tried to arouse her this afternoon he had difficulty in doing so.  She then started screaming out incoherently.  He reports that the only information that he got from her was that she felt sick all over.  Upon arrival to the ED patient was able to stand and transfer to the bed.  She initially would not answer ER providers questions but after returning from head CT was oriented to person and place.  She did report pain on the top of her head. Upon my exam patient reports severe abdominal pain, at first refusing to indicate where. Finally after much prompting for specific area, she points to her abdomen. Patient's  reports previous SBO episode.     Primary Discharge diagnoses: Acute cholecystitis status post laparoscopic cholecystectomy on 2/28/2022    Secondary Discharge Diagnoses: Pulmonary emboli-stable on home Eliquis, CKD stage III-stable, cervical degenerative disc disease-stable on home baclofen, hypothyroidism-TSH within normal limits stable on home levothyroxine, major depressive disorder-stable on home citalopram, GERD stable on home regimen.     Hospital Course Summary: Patient was admitted for delirium initially.  When she presented to the [facility]  [facility] she was incoherent and moaning in pain, initially received stroke work-up and encephalopathy work-up, which was negative including CT head, ammonia level, B12 and folate,, TSH was within normal limits.  Patient endorsed that she had abdominal pain and therefore CT abdomenwas ordered and acute cholecystitis was found, surgery assisted in the patient's care and performed laparoscopic cholecystectomy on 2/28/2022, after patient was transitioned from Eliquis to heparin for her PE to allow for surgery.  After surgery on 3/1/2022 she was placed back on her home Eliquis dosing and heparin was discontinued.  Patient continued to have normal mentation after she received IV fentanyl after admission.  Therefore no additional stroke work-up was needed, patient has no neurologic deficits at time of discharge, and did not throughout her stay. On 3/1/2022 patient's condition had improved. They were deemed stable for discharge. They were advised to take all medications as prescribed, follow up with PCP within 1 week. If there are any issues patient should contact PCP and/or return to the ED for follow up. Patient was agreeable to the plan and subsequently discharged at this time.     PCP  Patient Care Team:  Harpreet Alcantara MD as PCP - General  Harpreet Alcantara MD as PCP - Family Medicine    Consults:   Consults     Date and Time Order Name Status Description    2/26/2022  9:50 PM Inpatient General Surgery Consult            Operations and Procedures Performed:  Procedure(s):  CHOLECYSTECTOMY LAPAROSCOPIC INTRAOPERATIVE CHOLANGIOGRAM     CT Chest Without Contrast Diagnostic    Result Date: 2/18/2022  Narrative: CT Chest WO INDICATION: 76-year-old emergency department patient with hypoxia. Low oxygen saturation levels in emergency department today TECHNIQUE: CT of the thorax without IV contrast. Coronal and sagittal reconstructions were obtained.  Radiation dose reduction techniques included automated exposure control or  exposure modulation based on body size. Count of known CT and cardiac nuc med studies performed in previous 12 months: 0. COMPARISON: None available. FINDINGS: Images through the thoracic inlet are normal. Images of the chest demonstrate atherosclerotic change of the aorta and coronary arteries. The ascending aorta measures 3.2 cm which is within normal limits. Cardiac chambers, pericardium and esophagus are normal. Lung window images demonstrate linear reticular/ground glass change in the right upper lobe on image 26 series 4 in the posterior left upper lobe best seen on image 37 series 4. These changes are nonspecific. There is dependent density of both lung bases consistent with chronic atelectasis or scar. There is no definite acute pneumonia or edema. There are no effusions. Limited views through the upper abdomen demonstrate benign calcified granulomatous changes in the liver and spleen. No adrenal lesion. Bone window images demonstrate multilevel degenerative change with mild underlying scoliosis of the lower thoracic spine. No acute fracture.     Impression: 1. There is linear/groundglass change in the posterior right upper lobe and left upper lobe which is nonspecific and could represent atelectasis or sequela of previous infection. 2. There are linear dependent densities at both bases most consistent with chronic scar or atelectasis. 3. No evidence of acute edema or pneumonia. 4. The presence of pulmonary embolism cannot be excluded without contrast. The aorta is normal in caliber. There are extensive atherosclerotic calcification in the aorta and coronary arteries. Signer Name: Josette Tong MD  Signed: 2/18/2022 3:18 PM  Workstation Name: [id number]  [facility]    CT Cervical Spine Without Contrast    Result Date: 2/18/2022  Narrative: CT Spine Cervical WO INDICATION: Sharp neck pain for the past 3 days radiating to both upper extremities, worse on the right TECHNIQUE: CT of  the cervical spine without IV contrast. Coronal and sagittal reconstructions were obtained.  Radiation dose reduction techniques included automated exposure control or exposure modulation based on body size. Count of known CT and cardiac nuc med studies performed in previous 12 months: 0. COMPARISON: None available. FINDINGS: There is advanced degenerative change at the articulation of the odontoid with the anterior arch of C1 accompanied by thickening and calcification of the transverse ligament. This causes moderate narrowing just below the foramen magnum but no cord compression. At C2-3 the disc is unremarkable. Facet arthropathy is moderate bilaterally. The right foramen is mildly narrowed. At C3-4 there is moderate bilateral facet arthropathy. The disc is narrowed and there is a grade 1 anterolisthesis of approximately 3 mm. Foraminal narrowing is mild on the left and moderate on the right. At C4-5 there is disc space narrowing with a degenerative grade 1 anterolisthesis of approximately 3 mm. Posterior disc bulging with osteophyte is noted to a mild degree. Facet arthropathy is moderately severe bilaterally. Central stenosis is mild. Foraminal narrowing is mild on the left and moderate on the right. Postoperative changes of anterior fusion are seen across C5-C6. There is no evidence of pseudoarthrosis. Foraminal narrowing is mild bilaterally. The facet joints are ankylosed. At C6-7 there is advanced degenerative disc disease with disc space collapse and reactive endplate changes. Posterior osteophyte formation is seen to a mild degree extending to the uncovertebral joints. Foraminal narrowing is mild bilaterally. At C7-T1 the disc is unremarkable. There is moderate facet arthropathy on the right. The right foramen is mildly narrowed. No fractures are seen. No destructive bone lesions are noted. Paraspinous soft tissues are unremarkable. IMPRESSION: Intact fusion at C5-6. Multilevel degenerative changes as  described above level by level. No acute findings. Signer Name: Harpreet Harris MD  Signed: 2/18/2022 3:21 PM  Workstation Name: [id number]  [facility] Lung Scan Perfusion Particulate    Result Date: 2/18/2022  Narrative: RADIONUCLIDE PERFUSION LUNG SCAN, 02/18/2022  HISTORY: 76-year-old female with hypoxia, elevated d-dimer.  DOSE: 5.9 mCi technetium labeled MAA intravenously.  NOTE: Ventilation lung imaging is currently not allowed under COVID-19 restrictions on aerosol generating procedures.  COMPARISON: Noncontrast CT chest today.  FINDINGS: There is uniform distribution of perfusion activity throughout both lungs. No significant focal or multifocal perfusion abnormality is demonstrated to suggest pulmonary embolism.      Impression: 1. No perfusion abnormality is demonstrated to suggest pulmonary embolism. 2. Perfusion-only imaging.  This report was finalized on 2/18/2022 4:03 PM by Dr. Miguel Oliveros MD.      CT Abdomen Pelvis With Contrast    Result Date: 2/26/2022  Narrative: CT Abdomen Pelvis W INDICATION: Acute abdominal pain of unknown duration. Altered mental status TECHNIQUE: CT of the abdomen and pelvis with contrast. Coronal and sagittal reconstructions were obtained.  Radiation dose reduction techniques included automated exposure control or exposure modulation based on body size. Radiation audit for number of CT and nuclear cardiology exams performed in the last year: 3.  COMPARISON: No pertinent prior study FINDINGS: Exam compromised by motion artifact. Included lung bases are clear. Small hiatal hernia noted. Abdomen: The gallbladder is abnormal. There is a large calcified gallstone. There is thickening of the gallbladder wall. There appears to be pericholecystic fluid present. These findings suggest acute cholecystitis. The liver shows normal enhancement. The kidneys show normal enhancement. The spleen appears unremarkable. No definite pancreatic abnormalities  seen. The aorta shows prominent atherosclerotic change without aneurysmal dilatation. No threshold retroperitoneal adenopathy. Pelvis: The bowel pattern is nonobstructive. No free air is seen. No free fluid is identified. The urinary bladder appears unremarkable. Imaging of the pelvis is compromised by beam hardening artifact from the patient's left hip arthroplasty. Regional osseous structures show no acute or aggressive bone lesions. Prominent degenerative changes of the lumbar spine.     Impression: 1.  The gallbladder is abnormal. There is a large calcified gallstone in there is apparent thickening of the gallbladder wall with what appears to be pericholecystic fluid. The findings suggest acute cholecystitis. 2.  Prominent atherosclerotic change of the abdominal aorta. Signer Name: Donnie Michael MD  Signed: 2/26/2022 7:17 PM  Workstation Name: [facility]  [facility]    XR Chest 1 View    Result Date: 2/26/2022  Narrative: CR Chest 1 Vw INDICATION: Acute stroke protocol COMPARISON:  CTA of the chest dated 2/18/2010 FINDINGS: Portable AP view(s) of the chest.  The heart and mediastinal contours are normal. The lungs are hyperinflated but free of acute infiltrates. No pneumothorax or pleural effusion.     Impression: No clearly acute cardiopulmonary findings. Radiographic findings of COPD. Signer Name: Donnie Michael MD  Signed: 2/26/2022 4:46 PM  Workstation Name: [facility]  [facility]    US Venous Doppler Lower Extremity Bilateral (duplex)    Result Date: 2/19/2022  Narrative: US Veins LE Duplex BILAT HISTORY: Pulmonary embolism. Evaluate for DVT. TECHNIQUE: Real-time ultrasound was performed of both lower extremities utilizing spectral and color Doppler with compression and augmentation techniques. COMPARISON: None available. FINDINGS: Right Lower Extremity: There is no deep venous thrombus seen in the right lower extremity, including the right common femoral  veins, right femoral veins and right popliteal veins.  Normal compressibility and respiratory phasicity was visualized.  No calf vein thrombus. Greater saphenous vein is patent. Left Lower Extremity: There is no deep venous thrombus seen in the left lower extremity, including the left common femoral veins, left femoral veins and left popliteal veins.   Normal compressibility and respiratory phasicity was visualized.  No calf vein thrombus. Greater saphenous vein is patent.     Impression: No deep venous thrombus seen in either lower extremity. Signer Name: Harpreet Harris MD  Signed: 2/19/2022 11:43 AM  Workstation Name: [id number]  [facility] Cholangiogram Operative    Result Date: 2/28/2022  Narrative: FL CHOLANGIOGRAM OPERATIVE-: 2/28/2022 2:11 PM  INDICATION: Cholecystectomy.  Intraoperative cholangiogram.  FINDINGS: 2 fluoroscopic view(s) of the right upper quadrant during an intraoperative cholangiogram were submitted for review . The cystic duct was cannulated and injected. There is a normal caliber common bile duct. No filling defects are identified to suggest a retained stone. Contrast spills into the duodenum.  Fluoroscopic time: 4 seconds.      Impression: Negative intraoperative cholangiogram.  This report was finalized on 2/28/2022 2:51 PM by Dr. Harpreet Nova MD.      CT Angiogram Aorta    Result Date: 2/18/2022  Narrative: CT ANGIOGRAM AORTA INDICATION: Unequal blood pressures in the upper extremities. Low blood pressure in the left arm. Neck pain and stiffness for 3 days. TECHNIQUE: CT angiogram of the chest with IV contrast. 3-D reconstructions were obtained and reviewed.   Radiation dose reduction techniques included automated exposure control or exposure modulation based on body size. Count of known CT and cardiac nuc med studies performed in previous 12 months: 0. COMPARISON: CT chest without contrast 2/18/2022 FINDINGS: Adequate opacification of the pulmonary  arteries with subsegmental filling defects in the left lower lobe, consistent with acute pulmonary emboli. Questionable small subsegmental filling defect in the right upper lobe pulmonary artery. Thoracic aorta normal in course and caliber without dissection. High-grade stenosis at the left subclavian artery origin. Remaining great vessel origins are widely patent. Heart size is normal. No pericardial effusion. No pleural effusion. No pneumothorax. Bibasilar dependent subsegmental atelectasis. Linear groundglass opacities in the bilateral lung apices which may represent atelectasis or sequelae of previous infection. Included upper abdomen demonstrates cholelithiasis. No acute osseous abnormality.     Impression: 1. Acute pulmonary emboli in the left lower lobe subsegmental pulmonary arteries. Questionable small subsegmental filling defects in the right upper lobe pulmonary artery. No CT evidence of acute right heart strain. 2. Negative for thoracic aortic aneurysm/dissection. 3. High-grade stenosis at the left subclavian artery origin. 4. Bibasilar subsegmental dependent atelectasis. Linear groundglass opacities in the bilateral lung apices may represent atelectasis or sequelae of previous infection. 5. Cholelithiasis. NOTIFICATION: Critical Value/emergent results were called by telephone at the time of interpretation on 2/18/2022 6:52 PM to the emergency room physician, Dr. Elmo Blount, who verbally acknowledged these results. Signer Name: Farhan Michael MD  Signed: 2/18/2022 6:56 PM  Workstation Name: [email or web address]  [facility]    CT Head Without Contrast Stroke Protocol    Result Date: 2/26/2022  Narrative: CT Head WO Code Stroke HISTORY: 21 hour history of headache and dizziness TECHNIQUE: Axial unenhanced head CT. Radiation dose reduction techniques included automated exposure control or exposure modulation based on body size. Count of known CT and cardiac nuc med studies performed in  previous 12 months: 3. Time of scan: 4:03 PM COMPARISON: None. FINDINGS: The exam is compromised by motion artifact. The ventricles are generous in size. Cortical sulci are correspondingly prominent. No extraaxial fluid collections are seen. No parenchymal or subarachnoid hemorrhage is present. Periventricular hypodensities are demonstrated which are nonspecific but likely represent white matter microvascular change in a patient of this age. No CT evidence of mass or acute infarct. The mastoid air cells are clear. The visualized paranasal sinuses are clear. Orbital structures demonstrate no acute findings.     Impression: Impression: 1. No clearly acute intracranial pathology demonstrated. 2. Generalized cerebral atrophy and nonspecific periventricular hypodensities which are thought to represent white matter microvascular change. Signer Name: Donnie Michael MD  Signed: 2/26/2022 4:13 PM  Workstation Name: [id number]  [facility]      Allergies:  is allergic to codeine and sulfa antibiotics.    Stephane  Reviewed    Discharge Medications:     Discharge Medications      New Medications      Instructions Start Date   amLODIPine 5 MG tablet  Commonly known as: NORVASC   5 mg, Oral, Every 24 Hours Scheduled      ondansetron 4 MG tablet  Commonly known as: ZOFRAN   4 mg, Oral, 4 Times Daily PRN         Continue These Medications      Instructions Start Date   acetaminophen 650 MG 8 hr tablet  Commonly known as: TYLENOL   650 mg, Oral, Every 8 Hours PRN      apixaban 5 MG tablet tablet  Commonly known as: ELIQUIS   5 mg, Oral, 2 Times Daily, Take 2 tablets by mouth twice a day for the first 7 days      citalopram 20 MG tablet  Commonly known as: CeleXA   20 mg, Oral, Daily      famotidine 40 MG tablet  Commonly known as: PEPCID   40 mg, Oral, Daily PRN      levothyroxine 88 MCG tablet  Commonly known as: SYNTHROID, LEVOTHROID   88 mcg, Oral, Daily      lovastatin 20 MG tablet  Commonly known as:  MEVACOR   20 mg, Oral, Nightly      metoprolol succinate XL 50 MG 24 hr tablet  Commonly known as: TOPROL-XL   50 mg, Oral, 2 Times Daily      omeprazole 40 MG capsule  Commonly known as: priLOSEC   40 mg, Oral, Daily      traMADol 50 MG tablet  Commonly known as: ULTRAM   50 mg, Oral, Every 6 Hours PRN      Vitamin D (Cholecalciferol) 10 MCG (400 UNIT) tablet  Commonly known as: CHOLECALCIFEROL   400 Units, Oral, Daily         Stop These Medications    methocarbamol 500 MG tablet  Commonly known as: ROBAXIN     methylPREDNISolone 4 MG dose pack  Commonly known as: MEDROL            Last Lab Results:   Lab Results (most recent)     Procedure Component Value Units Date/Time    Basic Metabolic Panel [[id number]]  (Abnormal) Collected: 03/01/22 0448    Specimen: Blood Updated: 03/01/22 0527     Glucose 146 mg/dL      BUN 21 mg/dL      Creatinine 1.35 mg/dL      Sodium 136 mmol/L      Potassium 4.2 mmol/L      Chloride 102 mmol/L      CO2 24.3 mmol/L      Calcium 9.0 mg/dL      BUN/Creatinine Ratio 15.6     Anion Gap 9.7 mmol/L      eGFR 40.6 mL/min/1.73      Comment: National Kidney Foundation and American Society of Nephrology (ASN) Task Force recommended calculation based on the Chronic Kidney Disease Epidemiology Collaboration (CKD-EPI) equation refit without adjustment for race.       Narrative:      GFR Normal >60  Chronic Kidney Disease <60  Kidney Failure <15      aPTT [[id number]]  (Abnormal) Collected: 03/01/22 0448    Specimen: Blood Updated: 03/01/22 0508     PTT 93.1 seconds     Narrative:      PTT = The equivalent PTT values for the therapeutic range of heparin levels at 0.1 to 0.7 U/ml are 53 to 110 seconds.      CBC & Differential [[id number]]  (Abnormal) Collected: 03/01/22 0448    Specimen: Blood Updated: 03/01/22 0502    Narrative:      The following orders were created for panel order CBC & Differential.  Procedure                               Abnormality         Status                     ---------                                -----------         ------                     CBC Auto Differential[[id number]]        Abnormal            Final result                 Please view results for these tests on the individual orders.    CBC Auto Differential [[id number]]  (Abnormal) Collected: 03/01/22 0448    Specimen: Blood Updated: 03/01/22 0502     WBC 13.78 10*3/mm3      RBC 4.08 10*6/mm3      Hemoglobin 12.2 g/dL      Hematocrit 39.9 %      MCV 97.8 fL      MCH 29.9 pg      MCHC 30.6 g/dL      RDW 13.7 %      RDW-SD 49.4 fl      MPV 12.1 fL      Platelets 203 10*3/mm3      Neutrophil % 83.6 %      Lymphocyte % 10.3 %      Monocyte % 5.3 %      Eosinophil % 0.0 %      Basophil % 0.3 %      Immature Grans % 0.5 %      Neutrophils, Absolute 11.52 10*3/mm3      Lymphocytes, Absolute 1.42 10*3/mm3      Monocytes, Absolute 0.73 10*3/mm3      Eosinophils, Absolute 0.00 10*3/mm3      Basophils, Absolute 0.04 10*3/mm3      Immature Grans, Absolute 0.07 10*3/mm3      nRBC 0.0 /100 WBC     aPTT [[id number]]  (Abnormal) Collected: 02/28/22 2255    Specimen: Blood Updated: 02/28/22 2316     PTT 49.3 seconds     Narrative:      PTT = The equivalent PTT values for the therapeutic range of heparin levels at 0.1 to 0.7 U/ml are 53 to 110 seconds.      Tissue Pathology Exam [[id number]] Collected: 02/28/22 1358    Specimen: Tissue from Gallbladder Updated: 02/28/22 1436    CBC & Differential [[id number]]  (Abnormal) Collected: 02/28/22 0338    Specimen: Blood Updated: 02/28/22 0408    Narrative:      The following orders were created for panel order CBC & Differential.  Procedure                               Abnormality         Status                     ---------                               -----------         ------                     CBC Auto Differential[[id number]]        Abnormal            Final result                 Please view results for these tests on the individual orders.    CBC Auto Differential [[id number]]  (Abnormal)  Collected: 02/28/22 0338    Specimen: Blood Updated: 02/28/22 0408     WBC 8.73 10*3/mm3      RBC 4.29 10*6/mm3      Hemoglobin 12.8 g/dL      Hematocrit 40.9 %      MCV 95.3 fL      MCH 29.8 pg      MCHC 31.3 g/dL      RDW 13.9 %      RDW-SD 48.8 fl      MPV 12.0 fL      Platelets 249 10*3/mm3      Neutrophil % 50.3 %      Lymphocyte % 38.0 %      Monocyte % 8.7 %      Eosinophil % 1.4 %      Basophil % 0.7 %      Immature Grans % 0.9 %      Neutrophils, Absolute 4.39 10*3/mm3      Lymphocytes, Absolute 3.32 10*3/mm3      Monocytes, Absolute 0.76 10*3/mm3      Eosinophils, Absolute 0.12 10*3/mm3      Basophils, Absolute 0.06 10*3/mm3      Immature Grans, Absolute 0.08 10*3/mm3      nRBC 0.0 /100 WBC     Vitamin B12 & Folate [[id number]]  (Normal) Collected: 02/26/22 1555    Specimen: Blood Updated: 02/27/22 1627     Folate 12.10 ng/mL      Vitamin B-12 613 pg/mL     Narrative:      Results may be falsely increased if patient taking Biotin.      Protime-INR [[id number]]  (Abnormal) Collected: 02/27/22 0804    Specimen: Blood Updated: 02/27/22 0821     Protime 16.6 Seconds      INR 1.31    Narrative:      Therapeutic Ranges for INR: 2.0-3.0 (PT 20-30)                              2.5-3.5 (PT 25-34)    Ammonia [[id number]]  (Normal) Collected: 02/26/22 1940    Specimen: Blood Updated: 02/26/22 2002     Ammonia 13 umol/L     TSH [[id number]]  (Normal) Collected: 02/26/22 1555    Specimen: Blood Updated: 02/26/22 1954     TSH 0.616 uIU/mL     Lipid Panel [[id number]]  (Abnormal) Collected: 02/26/22 1555    Specimen: Blood Updated: 02/26/22 1948     Total Cholesterol 176 mg/dL      Triglycerides 139 mg/dL      HDL Cholesterol 45 mg/dL      LDL Cholesterol  106 mg/dL      VLDL Cholesterol 25 mg/dL      LDL/HDL Ratio 2.29    Narrative:      Cholesterol Reference Ranges  (U.S. Department of Health and Human Services ATP III Classifications)    Desirable          <200 mg/dL  Borderline High    200-239 mg/dL  High Risk           >240 mg/dL      Triglyceride Reference Ranges  (U.S. Department of Health and Human Services ATP III Classifications)    Normal           <150 mg/dL  Borderline High  150-199 mg/dL  High             200-499 mg/dL  Very High        >500 mg/dL    HDL Reference Ranges  (U.S. Department of Health and Human Services ATP III Classifications)    Low     <40 mg/dl (major risk factor for CHD)  High    >60 mg/dl ('negative' risk factor for CHD)        LDL Reference Ranges  ([place] Department of Health and Human Services ATP III Classifications)    Optimal          <100 mg/dL  Near Optimal     100-129 mg/dL  Borderline High  130-159 mg/dL  High             160-189 mg/dL  Very High        >189 mg/dL    COVID PRE-OP / PRE-PROCEDURE SCREENING ORDER (NO ISOLATION) - Swab, Nasal Cavity [[id number]]  (Normal) Collected: 02/26/22 1757    Specimen: Swab from Nasal Cavity Updated: 02/26/22 1835    Narrative:      The following orders were created for panel order COVID PRE-OP / PRE-PROCEDURE SCREENING ORDER (NO ISOLATION) - Swab, Nasal Cavity.  Procedure                               Abnormality         Status                     ---------                               -----------         ------                     COVID-19,[facility]...[[id number]]  Normal              Final result                 Please view results for these tests on the individual orders.    COVID-19,[facility] IN-HOUSE,Nasal Swab No Transport Media 3-4 HR TAT - Swab, Nasal Cavity [[id number]]  (Normal) Collected: 02/26/22 1757    Specimen: Swab from Nasal Cavity Updated: 02/26/22 1835     COVID19 Not Detected    Narrative:      Fact sheet for providers: https://www.fda.gov/media/764541/download     Fact sheet for patients: https://www.fda.gov/media/687766/download    Test performed by PCR.    Consider negative results in combination with clinical observations, patient history, and epidemiological information.    Ethanol [[id number]] Collected: 02/26/22 3741     Specimen: Blood Updated: 02/26/22 1709     Ethanol <10 mg/dL      Ethanol % <0.010 %     Urinalysis, Microscopic Only - Urine, Catheter [[id number]]  (Abnormal) Collected: 02/26/22 1617    Specimen: Urine, Catheter Updated: 02/26/22 1706     RBC, UA None Seen /HPF      WBC, UA 0-2 /HPF      Bacteria, UA None Seen /HPF      Squamous Epithelial Cells, UA 0-2 /HPF      Hyaline Casts, UA None Seen /LPF      Methodology Manual Light Microscopy    [facility] [[id number]] Collected: 02/26/22 1555    Specimen: Blood Updated: 02/26/22 1700    Narrative:      The following orders were created for panel order [facility].  Procedure                               Abnormality         Status                     ---------                               -----------         ------                     Green Top (Gel)[[id number]]                                  Final result               Lavender Top[[id number]]                                     Final result               Gold Top - SST[[id number]]                                   Final result               Light Blue Top[[id number]]                                   Final result                 Please view results for these tests on the individual orders.    Green Top (Gel) [[id number]] Collected: 02/26/22 1555    Specimen: Blood Updated: 02/26/22 1700     Extra Tube Hold for add-ons.     Comment: Auto resulted.       Lavender Top [[id number]] Collected: 02/26/22 1555    Specimen: Blood Updated: 02/26/22 1700     Extra Tube hold for add-on     Comment: Auto resulted       Gold Top - SST [[id number]] Collected: 02/26/22 1555    Specimen: Blood Updated: 02/26/22 1700     Extra Tube Hold for add-ons.     Comment: Auto resulted.       Light Blue Top [[id number]] Collected: 02/26/22 1555    Specimen: Blood Updated: 02/26/22 1700     Extra Tube hold for add-on     Comment: Auto resulted       Urine Drug Screen - Urine, Clean Catch [[id number]]  (Normal) Collected: 02/26/22 1617    Specimen:  Urine, Clean Catch Updated: 02/26/22 1641     THC, Screen, Urine Negative     Phencyclidine (PCP), Urine Negative     Cocaine Screen, Urine Negative     Methamphetamine, Ur Negative     Opiate Screen Negative     Amphetamine Screen, Urine Negative     Benzodiazepine Screen, Urine Negative     Tricyclic Antidepressants Screen Negative     Methadone Screen, Urine Negative     Barbiturates Screen, Urine Negative     Oxycodone Screen, Urine Negative     Propoxyphene Screen Negative     Buprenorphine, Screen, Urine Negative    Narrative:      Urine drug screen results are to be used for medical purposes only.  They are not to be used for legal purposes such as employment testing.  Negative results do not necessarily mean the complete absence of a subtance, but rather that the result is less than the cutoff for that substance.  Positive results are unconfirmed and considered Preliminary Positive.  [facility] does not automatically confirm Postitive Unconfirmed results.  The physician may request (order) an Unconfirmed Positive result to be sent out for confirmation.      Negative Thresholds for Drugs Screened:    THC screen, urine                          50 ng/ml  Phenycyclidine (PCP), urine                25 ng/ml  Cocaine screen, urine                     150 ng/ml  Methamphetamine, urine                    500 ng/ml  Opiate screen, urine                      100 ng/ml  Amphetamine screen, urine                 500 ng/ml  Benzodiazepine screen, urine              150 ng/ml  Tricyclic Antidepressants screen, urine   300 ng/ml  Methadone screen, urine                   200 ng/ml  Barbiturates screen, urine                200 ng/ml  Oxycodone screen, urine                   100 ng/ml  Propoxyphene screen, urine                300 ng/ml  Buprenorphine screen, urine                10 ng/ml    Urinalysis With Microscopic If Indicated (No Culture) - Urine, Catheter [[id number]]  (Abnormal) Collected: 02/26/22  1617    Specimen: Urine, Catheter Updated: 02/26/22 1632     Color, UA Yellow     Appearance, UA Clear     pH, UA 7.5     Specific Gravity, UA 1.015     Glucose, UA Negative     Ketones, UA Negative     Bilirubin, UA Negative     Blood, UA Negative     Protein, UA 30 mg/dL (1+)     Leuk Esterase, UA Negative     Nitrite, UA Negative     Urobilinogen, UA 0.2 E.U./dL    Troponin [[id number]]  (Normal) Collected: 02/26/22 1555    Specimen: Blood Updated: 02/26/22 1623     Troponin T <0.010 ng/mL     Narrative:      Troponin T Reference Range:  <= 0.03 ng/mL-   Negative for AMI  >0.03 ng/mL-     Abnormal for myocardial necrosis.  Clinicians would have to utilize clinical acumen, EKG, Troponin and serial changes to determine if it is an Acute Myocardial Infarction or myocardial injury due to an underlying chronic condition.       Results may be falsely decreased if patient taking Biotin.      Comprehensive Metabolic Panel [[id number]]  (Abnormal) Collected: 02/26/22 1555    Specimen: Blood Updated: 02/26/22 1621     Glucose 119 mg/dL      BUN 18 mg/dL      Creatinine 1.00 mg/dL      Sodium 139 mmol/L      Potassium 4.1 mmol/L      Chloride 100 mmol/L      CO2 23.3 mmol/L      Calcium 10.1 mg/dL      Total Protein 8.1 g/dL      Albumin 4.30 g/dL      ALT (SGPT) 21 U/L      AST (SGOT) 19 U/L      Alkaline Phosphatase 155 U/L      Total Bilirubin 0.6 mg/dL      eGFR Non African Amer 54 mL/min/1.73      Globulin 3.8 gm/dL      A/G Ratio 1.1 g/dL      BUN/Creatinine Ratio 18.0     Anion Gap 15.7 mmol/L     Narrative:      GFR Normal >60  Chronic Kidney Disease <60  Kidney Failure <15      Protime-INR [[id number]]  (Normal) Collected: 02/26/22 1555    Specimen: Blood Updated: 02/26/22 1612     Protime 13.6 Seconds      INR 1.02    Narrative:      Therapeutic Ranges for INR: 2.0-3.0 (PT 20-30)                              2.5-3.5 (PT 25-34)    POC Glucose Once [[id number]]  (Normal) Collected: 02/26/22 1549    Specimen: Blood  Updated: 02/26/22 1557     Glucose 84 mg/dL      Comment: Meter: [id number] : [id number] Christa Alonzo  TECH           Imaging Results (Most Recent)     Procedure Component Value Units Date/Time    FL Cholangiogram Operative [[id number]] Collected: 02/28/22 1450     Updated: 02/28/22 1453    Narrative:      FL CHOLANGIOGRAM OPERATIVE-: 2/28/2022 2:11 PM     INDICATION:   Cholecystectomy.  Intraoperative cholangiogram.     FINDINGS:   2 fluoroscopic view(s) of the right upper quadrant during an  intraoperative cholangiogram were submitted for review . The cystic duct  was cannulated and injected. There is a normal caliber common bile duct.  No filling defects are identified to suggest a retained stone. Contrast  spills into the duodenum.     Fluoroscopic time: 4 seconds.       Impression:      Negative intraoperative cholangiogram.     This report was finalized on 2/28/2022 2:51 PM by Dr. Harpreet Nova MD.       CT Abdomen Pelvis With Contrast [[id number]] Collected: 02/26/22 1917     Updated: 02/26/22 1920    Narrative:      CT Abdomen Pelvis W    INDICATION:   Acute abdominal pain of unknown duration. Altered mental status    TECHNIQUE:   CT of the abdomen and pelvis with contrast. Coronal and sagittal reconstructions were obtained.  Radiation dose reduction techniques included automated exposure control or exposure modulation based on body size. Radiation audit for number of CT and  nuclear cardiology exams performed in the last year: 3.      COMPARISON:   No pertinent prior study    FINDINGS:    Exam compromised by motion artifact.      Included lung bases are clear. Small hiatal hernia noted.    Abdomen: The gallbladder is abnormal. There is a large calcified gallstone. There is thickening of the gallbladder wall. There appears to be pericholecystic fluid present. These findings suggest acute cholecystitis.    The liver shows normal enhancement. The kidneys show normal enhancement. The spleen appears  unremarkable. No definite pancreatic abnormalities seen.    The aorta shows prominent atherosclerotic change without aneurysmal dilatation. No threshold retroperitoneal adenopathy.    Pelvis: The bowel pattern is nonobstructive. No free air is seen. No free fluid is identified. The urinary bladder appears unremarkable. Imaging of the pelvis is compromised by beam hardening artifact from the patient's left hip arthroplasty.    Regional osseous structures show no acute or aggressive bone lesions. Prominent degenerative changes of the lumbar spine.      Impression:        1.  The gallbladder is abnormal. There is a large calcified gallstone in there is apparent thickening of the gallbladder wall with what appears to be pericholecystic fluid. The findings suggest acute cholecystitis.    2.  Prominent atherosclerotic change of the abdominal aorta.          Signer Name: Donnie Michael MD   Signed: 2/26/2022 7:17 PM   [facility]    Radiology [facility]    XR Chest 1 View [[id number]] Collected: 02/26/22 1646     Updated: 02/26/22 1648    Narrative:      CR Chest 1 Vw    INDICATION:   Acute stroke protocol     COMPARISON:    CTA of the chest dated 2/18/2010    FINDINGS:  Portable AP view(s) of the chest.  The heart and mediastinal contours are normal. The lungs are hyperinflated but free of acute infiltrates. No pneumothorax or pleural effusion.      Impression:      No clearly acute cardiopulmonary findings. Radiographic findings of COPD.    Signer Name: Donnie Michael MD   Signed: 2/26/2022 4:46 PM   Workstation [facility]    Radiology [facility]    CT Head Without Contrast Stroke Protocol [[id number]] Collected: 02/26/22 1613     Updated: 02/26/22 1615    Narrative:      CT Head WO Code Stroke    HISTORY:   21 hour history of headache and dizziness    TECHNIQUE:   Axial unenhanced head CT. Radiation dose reduction techniques included automated exposure control or  exposure modulation based on body size. Count of known CT and cardiac nuc med studies performed in previous 12 months: 3.     Time of scan: 4:03 PM    COMPARISON:   None.    FINDINGS:    The exam is compromised by motion artifact.    The ventricles are generous in size. Cortical sulci are correspondingly prominent. No extraaxial fluid collections are seen.    No parenchymal or subarachnoid hemorrhage is present. Periventricular hypodensities are demonstrated which are nonspecific but likely represent white matter microvascular change in a patient of this age. No CT evidence of mass or acute infarct.    The mastoid air cells are clear. The visualized paranasal sinuses are clear.  Orbital structures demonstrate no acute findings.      Impression:      Impression:  1. No clearly acute intracranial pathology demonstrated.  2. Generalized cerebral atrophy and nonspecific periventricular hypodensities which are thought to represent white matter microvascular change.    Signer Name: Donnie Michael MD   Signed: 2/26/2022 4:13 PM   Workstation Name: [id number]    [facility]          PROCEDURES  Procedure(s):  CHOLECYSTECTOMY LAPAROSCOPIC INTRAOPERATIVE CHOLANGIOGRAM    Condition on Discharge:  Stable, Improved.     Physical Exam at Discharge  Vital Signs  Temp:  [97.7 °F (36.5 °C)-98.7 °F (37.1 °C)] 97.7 °F (36.5 °C)  Heart Rate:  [63-83] 63  Resp:  [10-19] 15  BP: ()/(53-81) 105/69    Physical Exam  Please see today's progress note    Discharge Disposition  To home    Visiting Nurse:    No    Home PT/OT:  No    Home Safety Evaluation:  No    DME  None    Discharge Diet:      Dietary Orders (From admission, onward)     Start     Ordered    03/01/22 0905  Diet Regular  Diet Effective Now        Question:  Diet Texture / Consistency  Answer:  Regular    03/01/22 0904                Activity at Discharge:  As tolerated    Pre-discharge education  None      Follow-up Appointments  Future  Appointments   Date Time Provider Department Center   6/21/2022 11:00 AM Goznalez Bautista MD [facility]     Additional Instructions for the Follow-ups that You Need to Schedule     Discharge Follow-up with PCP   As directed       Currently Documented PCP:    Harpreet Alcantara MD    PCP Phone Number:    [phone]     Follow Up Details: 1 week               Test Results Pending at Discharge  Pending Labs     Order Current Status    Tissue Pathology Exam In process          Discharge over 30 min (if over 30 minutes give explanation as to why it took greater than 30 minutes)  Secondary to:   Coordination of care/follow up  Medication reconciliation  D/W patient      As of April 2021, as required by the Federal 21st Century Cures Act, medical records (including provider notes and laboratory/imaging results) are to be made available to patients and/or their designees as soon as the documents are signed/resulted. While the intention is to ensure transparency and to engage patients in their healthcare, this immediate access may create unintended consequences because this document uses language intended for communication between medical providers for interpretation with the entirety of the patient's clinical picture in mind. It is recommended that patients and/or their designees review all available information with their primary or specialist providers for explanation and to avoid misinterpretation of this information.

## 2022-03-01 NOTE — NURSING NOTE
Spoke with Dr Becker, stated for patient to follow up with her in 2 weeks, no lifting more than 8 lbs for 4-6 weeks.

## 2022-03-01 NOTE — PROGRESS NOTES
Patient: Janna Moy  Procedure(s):  CHOLECYSTECTOMY LAPAROSCOPIC INTRAOPERATIVE CHOLANGIOGRAM  Anesthesia type: general    Patient location: [facility] Surgical Floor  Last vitals:   Vitals:    03/01/22 1050   BP: 169/77   Pulse: 66   Resp: 18   Temp: 97.9 °F (36.6 °C)   SpO2: 96%     Level of consciousness: awake, alert and oriented    Post-anesthesia pain: adequate analgesia  Airway patency: patent  Respiratory: unassisted  Cardiovascular: stable and blood pressure at baseline  Hydration: euvolemic    Anesthetic complications: no

## 2022-03-02 ENCOUNTER — READMISSION MANAGEMENT (OUTPATIENT)
Dept: CALL CENTER | Facility: HOSPITAL | Age: 77
End: 2022-03-02

## 2022-03-02 LAB
LAB AP CASE REPORT: NORMAL
PATH REPORT.FINAL DX SPEC: NORMAL
PATH REPORT.GROSS SPEC: NORMAL

## 2022-03-02 NOTE — CASE MANAGEMENT/SOCIAL WORK
Case Management Discharge Note      Final Note: Discharged home.    Provided Post Acute Provider List?: Refused  Refused Provider List Comment: Pt declined  Provided Post Acute Provider Quality & Resource List?: Refused  Refused Quality and Resource List Comment: Pt declined    Selected Continued Care - Discharged on 3/1/2022 Admission date: 2/26/2022 - Discharge disposition: Home or Self Care    Destination    No services have been selected for the patient.              Durable Medical Equipment    No services have been selected for the patient.              Dialysis/Infusion    No services have been selected for the patient.              Home Medical Care    No services have been selected for the patient.              Therapy    No services have been selected for the patient.              Community Resources    No services have been selected for the patient.              Community & DME    No services have been selected for the patient.                       Final Discharge Disposition Code: 01 - home or self-care

## 2022-03-02 NOTE — OUTREACH NOTE
Prep Survey      Responses   [facility] facility patient discharged from? [place]   Is LACE score < 7 ? Yes   Emergency Room discharge w/ pulse ox? No   Eligibility Readm Mgmt   Discharge diagnosis Acute cholecystitis status post laparoscopic cholecystectomy    Does the patient have one of the following disease processes/diagnoses(primary or secondary)? General Surgery   Does the patient have Home health ordered? No   Is there a DME ordered? No   Prep survey completed? Yes          Karol Queen RN

## 2022-03-03 ENCOUNTER — TELEPHONE (OUTPATIENT)
Dept: SURGERY | Facility: CLINIC | Age: 77
End: 2022-03-03

## 2022-03-03 NOTE — TELEPHONE ENCOUNTER
Called pt to do her Post OP Telephone Follow up identified pt by birth date. Date of procedure was  2/28/22 DR Becker preformed the Cholecystectomy. Let pt know that the path report stated that she had Mild chronic calculous cholecystitis. Which meant that she had infection in the gallbladder , but it was removed and that the infection is no longer in the body. Pt did not have any pain post-operatively and took only tramadol and it gave her relief. Pt did not have a surgical dressing and there was no redness, swelling or bleeding. Pt did have some nausea after surgery, but is tolerating food well and is not taking any pain medication. Pt does not have any questions regarding her care at this time. Pt's post- op appointment is scheduled on 3/16/22 @  10 am. I let the pt know that if she has any problems such as bleeding, fever and chills to give our office a call

## 2022-03-16 ENCOUNTER — OFFICE VISIT (OUTPATIENT)
Dept: SURGERY | Facility: CLINIC | Age: 77
End: 2022-03-16

## 2022-03-16 VITALS
RESPIRATION RATE: 18 BRPM | DIASTOLIC BLOOD PRESSURE: 68 MMHG | WEIGHT: 149.4 LBS | OXYGEN SATURATION: 98 % | HEIGHT: 62 IN | SYSTOLIC BLOOD PRESSURE: 120 MMHG | HEART RATE: 65 BPM | BODY MASS INDEX: 27.49 KG/M2 | TEMPERATURE: 97.8 F

## 2022-03-16 DIAGNOSIS — Z09 FOLLOW UP: Primary | ICD-10-CM

## 2022-03-16 PROBLEM — M17.11 ARTHRITIS OF RIGHT KNEE: Status: ACTIVE | Noted: 2020-02-06

## 2022-03-16 PROCEDURE — 99024 POSTOP FOLLOW-UP VISIT: CPT | Performed by: SURGERY

## 2022-03-16 RX ORDER — OMEGA-3S/DHA/EPA/FISH OIL/D3 300MG-1000
400 CAPSULE ORAL
COMMUNITY

## 2022-03-16 NOTE — PROGRESS NOTES
"Chief complaint:    Chief Complaint   Patient presents with   • Post-op       History of Present Illness    This is Janna Moy 76 y.o. status post laparoscopic cholecystectomy and is doing very well.  Patient denies fever, chills, nausea or vomiting.  Patient's pain is well-controlled.      The following portions of the patient's history were reviewed and updated as appropriate: allergies, current medications, past family history, past medical history, past social history, past surgical history and problem list.    Vitals:    03/16/22 1010   Weight: 67.8 kg (149 lb 6.4 oz)   Height: 157.5 cm (62.01\")       Physical Exam  Gen.: Patient is alert and oriented ×3, no acute distress  HEENT: Normal cephalic, atraumatic, moist mucous membranes, anicteric  Incision is well-healed without evidence of infection, herniation or seroma.    Assessment/plan:    S/P laparoscopic cholecystectomy  I have instructed the patient not lift greater than 10 pounds for total of 6 weeks from the time of surgery.   I have instructed the patient follow-up as needed.    Ijeoma Becker MD  General, Minimally Invasive and Endoscopic Surgery  [facility]    [address] [facility] [address]    [address]  [address] [address]    P: [phone]  F: [phone]    Cc:  Harpreet Alcantara MD  "

## 2022-03-21 ENCOUNTER — TRANSCRIBE ORDERS (OUTPATIENT)
Dept: ADMINISTRATIVE | Facility: HOSPITAL | Age: 77
End: 2022-03-21

## 2022-03-21 ENCOUNTER — LAB (OUTPATIENT)
Dept: LAB | Facility: HOSPITAL | Age: 77
End: 2022-03-21

## 2022-03-21 DIAGNOSIS — N18.30 STAGE 3 CHRONIC KIDNEY DISEASE, UNSPECIFIED WHETHER STAGE 3A OR 3B CKD: ICD-10-CM

## 2022-03-21 DIAGNOSIS — N18.30 STAGE 3 CHRONIC KIDNEY DISEASE, UNSPECIFIED WHETHER STAGE 3A OR 3B CKD: Primary | ICD-10-CM

## 2022-03-21 LAB
ANION GAP SERPL CALCULATED.3IONS-SCNC: 12.4 MMOL/L (ref 5–15)
BUN SERPL-MCNC: 18 MG/DL (ref 8–23)
BUN/CREAT SERPL: 20 (ref 7–25)
CALCIUM SPEC-SCNC: 9.1 MG/DL (ref 8.6–10.5)
CHLORIDE SERPL-SCNC: 101 MMOL/L (ref 98–107)
CO2 SERPL-SCNC: 24.6 MMOL/L (ref 22–29)
CREAT SERPL-MCNC: 0.9 MG/DL (ref 0.57–1)
EGFRCR SERPLBLD CKD-EPI 2021: 66 ML/MIN/1.73
GLUCOSE SERPL-MCNC: 89 MG/DL (ref 65–99)
POTASSIUM SERPL-SCNC: 3.9 MMOL/L (ref 3.5–5.2)
SODIUM SERPL-SCNC: 138 MMOL/L (ref 136–145)

## 2022-03-21 PROCEDURE — 36415 COLL VENOUS BLD VENIPUNCTURE: CPT

## 2022-03-21 PROCEDURE — 80048 BASIC METABOLIC PNL TOTAL CA: CPT

## 2022-06-21 RX ORDER — METOPROLOL SUCCINATE 50 MG/1
TABLET, EXTENDED RELEASE ORAL
Qty: 90 TABLET | Refills: 0 | Status: SHIPPED | OUTPATIENT
Start: 2022-06-21 | End: 2022-07-19 | Stop reason: SDUPTHER

## 2022-07-19 ENCOUNTER — OFFICE VISIT (OUTPATIENT)
Dept: CARDIOLOGY | Facility: CLINIC | Age: 77
End: 2022-07-19

## 2022-07-19 VITALS
DIASTOLIC BLOOD PRESSURE: 80 MMHG | HEART RATE: 61 BPM | HEIGHT: 62 IN | WEIGHT: 152.9 LBS | SYSTOLIC BLOOD PRESSURE: 124 MMHG | BODY MASS INDEX: 28.14 KG/M2

## 2022-07-19 DIAGNOSIS — I77.1 SUBCLAVIAN ARTERY STENOSIS: ICD-10-CM

## 2022-07-19 DIAGNOSIS — I10 PRIMARY HYPERTENSION: Primary | ICD-10-CM

## 2022-07-19 DIAGNOSIS — Z87.448 HISTORY OF ACUTE RENAL FAILURE: ICD-10-CM

## 2022-07-19 DIAGNOSIS — Z86.718 HISTORY OF VENOUS THROMBOEMBOLISM: ICD-10-CM

## 2022-07-19 PROBLEM — R94.31 ELECTROCARDIOGRAM ABNORMAL: Status: RESOLVED | Noted: 2019-12-22 | Resolved: 2022-07-19

## 2022-07-19 PROCEDURE — 93000 ELECTROCARDIOGRAM COMPLETE: CPT | Performed by: INTERNAL MEDICINE

## 2022-07-19 PROCEDURE — 99214 OFFICE O/P EST MOD 30 MIN: CPT | Performed by: INTERNAL MEDICINE

## 2022-07-19 RX ORDER — LOVASTATIN 20 MG/1
1 TABLET ORAL NIGHTLY
COMMUNITY
Start: 2022-07-14 | End: 2022-07-19 | Stop reason: SDUPTHER

## 2022-07-19 RX ORDER — METOPROLOL SUCCINATE 50 MG/1
50 TABLET, EXTENDED RELEASE ORAL 2 TIMES DAILY
Qty: 180 TABLET | Refills: 0 | Status: SHIPPED | OUTPATIENT
Start: 2022-07-19 | End: 2022-12-13

## 2022-07-19 NOTE — PROGRESS NOTES
Date of Office Visit: 22  Encounter Provider: Gonzalez Bautista MD  Place of Service: [facility] CARDIOLOGY  Patient Name: Janna Moy  :1945    Chief Complaint   Patient presents with   • Hypertension   :     HPI:     Ms. Moy is 76 y.o. and presents today to follow up.  She established care with me in . I have reviewed prior notes and there are no changes except for any new updates described below. I have also reviewed any information entered into the medical record by the patient or by ancillary staff.     In 2019, she was evaluated for preoperative risk assessment due to an abnormal EKG (which showed some nonspecific ST flattening, likely due to hypertension). She had a normal perfusion stress test at that time and had surgery without issue.  She was then seen again in 2020 for preoperative risk assessment for the same surgery, and no testing was ordered, and surgery proceeded without issue.     When she established care with me, I adjusted her antihypertensives. Unfortunately, she developed DASHA while on lisinopril-HCTZ.     She presented in 2022 with neck pain and was incidentally noted to have multiple subsegmental pulmonary emboli.  She was also incidentally noted to have left subclavian stenosis with disparate blood pressures in the upper extremities.  She has been on apixaban since then.  Shortly after that, she developed acute cholecystitis and required cholecystectomy.    From cardiac standpoint, she is doing well.  She denies chest pain, shortness of breath, palpitations, lightheadedness, syncope, orthopnea, or leg swelling.  She denies any vertigo or neurologic symptoms, and denies arm claudication, even with activities that involve raising her left arm above her head.    Past Medical History:   Diagnosis Date   • Acute renal failure (ARF) (HCC)     due to ACE and HCTZ   • Arthritis    • Delirium due to another medical condition  2022   • Functional diarrhea 2020   • GERD (gastroesophageal reflux disease)    • History of total knee arthroplasty    • History of venous thromboembolism 2022   • Hyperlipidemia    • Hypertension    • Hypothyroidism    • Subclavian artery stenosis (HCC)     left       Past Surgical History:   Procedure Laterality Date   • CHOLECYSTECTOMY WITH INTRAOPERATIVE CHOLANGIOGRAM N/A 2022    Procedure: CHOLECYSTECTOMY LAPAROSCOPIC INTRAOPERATIVE CHOLANGIOGRAM;  Surgeon: Ijeoma Becker MD;  Location: [facility];  Service: General;  Laterality: N/A;   • COLONOSCOPY     • EYE SURGERY     • HYSTERECTOMY     • JOINT REPLACEMENT     • REPLACEMENT TOTAL KNEE Left    • REPLACEMENT TOTAL KNEE Right    • TONSILLECTOMY     • TOTAL HIP ARTHROPLASTY Left        Social History     Socioeconomic History   • Marital status:    Tobacco Use   • Smoking status: Former Smoker     Types: Cigarettes     Quit date:      Years since quittin.5   • Smokeless tobacco: Never Used   Vaping Use   • Vaping Use: Never used   Substance and Sexual Activity   • Alcohol use: Not Currently   • Drug use: Never   • Sexual activity: Not Currently       Family History   Problem Relation Age of Onset   • Hypertension Father    • Breast cancer Neg Hx    • Colon cancer Neg Hx    • Colon polyps Neg Hx        Review of Systems   Constitutional: Positive for malaise/fatigue.   All other systems reviewed and are negative.      Allergies   Allergen Reactions   • Codeine Delirium     Other reaction(s): Delirium   • Sulfa Antibiotics Other (See Comments)     Pt had joint pain  Pt had joint pain  Pt had joint pain         Current Outpatient Medications:   •  metoprolol succinate XL (TOPROL-XL) 50 MG 24 hr tablet, Take 1 tablet by mouth 2 (Two) Times a Day., Disp: 180 tablet, Rfl: 0  •  acetaminophen (TYLENOL) 650 MG 8 hr tablet, Take 650 mg by mouth Every 8 (Eight) Hours As Needed., Disp: , Rfl:   •  apixaban (ELIQUIS) 5 MG tablet  "tablet, Take 1 tablet by mouth 2 (Two) Times a Day. Take 2 tablets by mouth twice a day for the first 7 days, Disp: 74 tablet, Rfl: 0  •  cholecalciferol (VITAMIN D3) 10 MCG (400 UNIT) tablet, Take 400 Units by mouth., Disp: , Rfl:   •  citalopram (CeleXA) 20 MG tablet, Take 20 mg by mouth Daily., Disp: , Rfl:   •  famotidine (PEPCID) 40 MG tablet, Take 40 mg by mouth Daily As Needed., Disp: , Rfl:   •  levothyroxine (SYNTHROID, LEVOTHROID) 88 MCG tablet, Take 88 mcg by mouth Daily., Disp: , Rfl:   •  lovastatin (MEVACOR) 20 MG tablet, Take 20 mg by mouth Every Night., Disp: , Rfl:   •  omeprazole (priLOSEC) 40 MG capsule, Take 1 capsule by mouth Daily., Disp: 90 capsule, Rfl: 3  •  ondansetron (ZOFRAN) 4 MG tablet, Take 1 tablet by mouth 4 (Four) Times a Day As Needed for Nausea or Vomiting., Disp: 10 tablet, Rfl: 0  •  traMADol (ULTRAM) 50 MG tablet, Take 50 mg by mouth Every 6 (Six) Hours As Needed., Disp: , Rfl:   •  Vitamin D, Cholecalciferol, (CHOLECALCIFEROL) 10 MCG (400 UNIT) tablet, Take 400 Units by mouth Daily., Disp: , Rfl:       Objective:     Vitals:    07/19/22 1029 07/19/22 1053   BP: 140/80 124/80   BP Location: Right arm Right arm   Pulse: 61    Weight: 69.4 kg (152 lb 14.4 oz)    Height: 157.5 cm (62.01\")      Body mass index is 27.96 kg/m².    Vitals reviewed.   Constitutional:       Appearance: Healthy appearance. Well-developed and not in distress.   Eyes:      Conjunctiva/sclera: Conjunctivae normal.   HENT:      Head: Normocephalic.      Nose: Nose normal.         Comments: Masked  Neck:      Vascular: No JVD. JVD normal.   Pulmonary:      Effort: Pulmonary effort is normal.      Breath sounds: Normal breath sounds.   Cardiovascular:      Normal rate. Regular rhythm.      Murmurs: There is no murmur.      Comments: Delayed left radial/ulnar pulses c/w right; left subclavian bruit  Edema:     Peripheral edema absent.   Abdominal:      Palpations: Abdomen is soft.      Tenderness: There is no " abdominal tenderness.   Musculoskeletal: Normal range of motion.      Cervical back: Normal range of motion. Skin:     General: Skin is warm and dry.      Findings: No rash.   Neurological:      General: No focal deficit present.      Mental Status: Alert, oriented to person, place, and time and oriented to person, place and time.      Cranial Nerves: No cranial nerve deficit.   Psychiatric:         Behavior: Behavior normal.         Thought Content: Thought content normal.         Judgment: Judgment normal.           ECG 12 Lead    Date/Time: 7/19/2022 10:42 AM  Performed by: Gonzalez Bautista MD  Authorized by: Gonzalez Bautista MD   Comparison: compared with previous ECG   Similar to previous ECG  Rhythm: sinus rhythm  Conduction: conduction normal  ST Segments: ST segments normal  T Waves: T waves normal  QRS axis: normal  Other findings: poor R wave progression    Clinical impression: normal ECG            Assessment:       Diagnosis Plan   1. Primary hypertension  ECG 12 Lead   2. History of acute renal failure     3. Subclavian artery stenosis (HCC)     4. History of venous thromboembolism  Ambulatory Referral to Hematology / Oncology      Plan:         1/2/3.  Her blood pressure should only ever be checked in her right arm as she has significant left subclavian stenosis.  However, she has absolutely no symptoms of arm claudication or subclavian steal, and as such does not require intervention.  When she was on lisinopril-HCTZ in the past, she developed acute renal failure, so that should never be restarted.  Her blood pressure is currently well controlled on metoprolol alone.    4.  She was diagnosed with multiple small PEs in February without any evidence of DVT on ultrasound.  Does she have to be anticoagulated for life?  Because she had to be on full dose anticoagulation?  She did not have any evidence of right heart strain. I'm going to have her evaluated by hematology for further  recommendations.    Sincerely,       Gonzalez Bautista MD

## 2022-08-02 ENCOUNTER — TRANSCRIBE ORDERS (OUTPATIENT)
Dept: ADMINISTRATIVE | Facility: HOSPITAL | Age: 77
End: 2022-08-02

## 2022-08-02 DIAGNOSIS — Z12.31 SCREENING MAMMOGRAM, ENCOUNTER FOR: Primary | ICD-10-CM

## 2022-08-03 ENCOUNTER — CONSULT (OUTPATIENT)
Dept: ONCOLOGY | Facility: CLINIC | Age: 77
End: 2022-08-03

## 2022-08-03 ENCOUNTER — APPOINTMENT (OUTPATIENT)
Dept: LAB | Facility: HOSPITAL | Age: 77
End: 2022-08-03

## 2022-08-03 VITALS
SYSTOLIC BLOOD PRESSURE: 159 MMHG | DIASTOLIC BLOOD PRESSURE: 82 MMHG | BODY MASS INDEX: 32.87 KG/M2 | HEIGHT: 58 IN | OXYGEN SATURATION: 94 % | WEIGHT: 156.6 LBS | TEMPERATURE: 97.5 F | HEART RATE: 87 BPM | RESPIRATION RATE: 16 BRPM

## 2022-08-03 DIAGNOSIS — Z86.718 HISTORY OF VENOUS THROMBOEMBOLISM: Primary | ICD-10-CM

## 2022-08-03 DIAGNOSIS — I26.94 MULTIPLE SUBSEGMENTAL PULMONARY EMBOLI WITHOUT ACUTE COR PULMONALE: ICD-10-CM

## 2022-08-03 PROCEDURE — 99204 OFFICE O/P NEW MOD 45 MIN: CPT | Performed by: INTERNAL MEDICINE

## 2022-08-03 NOTE — PROGRESS NOTES
Subjective     REASON FOR CONSULTATION: Pulmonary emboli  Provide an opinion on any further workup or treatment                             REQUESTING PHYSICIAN: Dr. Bautista    RECORDS OBTAINED:  Records of the patients history including those obtained from the referring provider were reviewed and summarized in detail.      History of Present Illness   This is a pleasant 77-year-old woman who has hypertension, hyperlipidemia and CKD.  She presented to the ER in February 2022 and had with delirium/altered mental status and underwent a CT angiogram of the chest after an elevated D-dimer which showed acute pulmonary emboli and left lower lobe subsegmental pulmonary arteries and questionable filling defects in the right upper lobe pulmonary artery.  A duplex ultrasound of the lower extremities bilaterally showed no DVT.  She was discharged home but continued to feel ill and have altered mental status.  She returned to the hospital and then had CT abdomen on 2/26/2022 which showed thickening of the gallbladder and pericholecystic fluid suggestive of acute cholecystitis.  She underwent a cholecystectomy.    The patient has remained on Eliquis for anticoagulation since her diagnosis of PE in February.  The admission note states she was on estrogen at the time of her PE but the patient states she has not been on estrogen for several years.  She denies prior history of DVT but states she had what sounds like a superficial phlebitis in the left lower extremity in the early 1990s while admitted for pneumonia and a bowel obstruction.    The patient denies any family history of blood clotting disorders or blood clots.    Past Medical History:   Diagnosis Date   • Acute renal failure (ARF) (HCC)     due to ACE and HCTZ   • Arthritis    • Delirium due to another medical condition 02/27/2022   • Functional diarrhea 09/09/2020   • GERD (gastroesophageal reflux disease)    • History of total knee arthroplasty    • History of venous  thromboembolism 07/19/2022   • Hyperlipidemia    • Hypertension    • Hypothyroidism    • Subclavian artery stenosis (HCC)     left        Past Surgical History:   Procedure Laterality Date   • CHOLECYSTECTOMY WITH INTRAOPERATIVE CHOLANGIOGRAM N/A 2/28/2022    Procedure: CHOLECYSTECTOMY LAPAROSCOPIC INTRAOPERATIVE CHOLANGIOGRAM;  Surgeon: Ijeoma Becker MD;  Location: [facility];  Service: General;  Laterality: N/A;   • COLONOSCOPY     • EYE SURGERY     • HYSTERECTOMY     • JOINT REPLACEMENT     • REPLACEMENT TOTAL KNEE Left    • REPLACEMENT TOTAL KNEE Right    • TONSILLECTOMY     • TOTAL HIP ARTHROPLASTY Left         Current Outpatient Medications on File Prior to Visit   Medication Sig Dispense Refill   • acetaminophen (TYLENOL) 650 MG 8 hr tablet Take 650 mg by mouth Every 8 (Eight) Hours As Needed.     • apixaban (ELIQUIS) 5 MG tablet tablet Take 1 tablet by mouth 2 (Two) Times a Day. Take 2 tablets by mouth twice a day for the first 7 days 74 tablet 0   • cholecalciferol (VITAMIN D3) 10 MCG (400 UNIT) tablet Take 400 Units by mouth.     • citalopram (CeleXA) 20 MG tablet Take 20 mg by mouth Daily.     • famotidine (PEPCID) 40 MG tablet Take 40 mg by mouth Daily As Needed.     • levothyroxine (SYNTHROID, LEVOTHROID) 88 MCG tablet Take 88 mcg by mouth Daily.     • lovastatin (MEVACOR) 20 MG tablet Take 20 mg by mouth Every Night.     • metoprolol succinate XL (TOPROL-XL) 50 MG 24 hr tablet Take 1 tablet by mouth 2 (Two) Times a Day. 180 tablet 0   • traMADol (ULTRAM) 50 MG tablet Take 50 mg by mouth Every 6 (Six) Hours As Needed.     • VITAMIN D PO Take  by mouth.     • Vitamin D, Cholecalciferol, (CHOLECALCIFEROL) 10 MCG (400 UNIT) tablet Take 400 Units by mouth Daily.     • omeprazole (priLOSEC) 40 MG capsule Take 1 capsule by mouth Daily. 90 capsule 3   • ondansetron (ZOFRAN) 4 MG tablet Take 1 tablet by mouth 4 (Four) Times a Day As Needed for Nausea or Vomiting. 10 tablet 0     No current  "facility-administered medications on file prior to visit.        ALLERGIES:    Allergies   Allergen Reactions   • Codeine Delirium     Other reaction(s): Delirium   • Sulfa Antibiotics Other (See Comments)     Pt had joint pain  Pt had joint pain  Pt had joint pain        Social History     Socioeconomic History   • Marital status:    Tobacco Use   • Smoking status: Former Smoker     Types: Cigarettes     Quit date:      Years since quittin.6   • Smokeless tobacco: Never Used   Vaping Use   • Vaping Use: Never used   Substance and Sexual Activity   • Alcohol use: Not Currently   • Drug use: Never   • Sexual activity: Not Currently        Family History   Problem Relation Age of Onset   • Hypertension Father    • Breast cancer Neg Hx    • Colon cancer Neg Hx    • Colon polyps Neg Hx         Review of Systems   Constitutional: Negative.    HENT: Negative.    Respiratory: Negative.    Cardiovascular: Negative.    Gastrointestinal: Negative.    Genitourinary: Negative.    Musculoskeletal: Positive for arthralgias and back pain.   Allergic/Immunologic: Negative.    Neurological: Negative.    Hematological: Negative.    Psychiatric/Behavioral: Negative.           Objective     Vitals:    22 1325   BP: 159/82   Pulse: 87   Resp: 16   Temp: 97.5 °F (36.4 °C)   TempSrc: Infrared   SpO2: 94%   Weight: 71 kg (156 lb 9.6 oz)   Height: 148.2 cm (58.35\")  Comment: new ht - no shoes   PainSc: 0-No pain     Current Status 8/3/2022   ECOG score 0       Physical Exam    CONSTITUTIONAL: pleasant well-developed adult woman  HEENT: no icterus, no thrush, moist membranes  LYMPH: no cervical or supraclavicular lad  CV: RRR, S1S2, no murmur  RESP: cta bilat, no wheezing, no rales  GI: soft, non-tender, no splenomegaly, +bs  MUSC: no edema, normal gait  NEURO: alert and oriented x3, normal strength  PSYCH: normal mood    RECENT LABS:  Hematology WBC   Date Value Ref Range Status   2022 13.78 (H) 3.40 - 10.80 " 10*3/mm3 Final   11/03/2021 7.68 4.5 - 11.0 10*3/uL Final     RBC   Date Value Ref Range Status   03/01/2022 4.08 3.77 - 5.28 10*6/mm3 Final   11/03/2021 4.10 4.0 - 5.2 10*6/uL Final     Hemoglobin   Date Value Ref Range Status   03/01/2022 12.2 12.0 - 15.9 g/dL Final   11/03/2021 12.2 12.0 - 16.0 g/dL Final     Hematocrit   Date Value Ref Range Status   03/01/2022 39.9 34.0 - 46.6 % Final   11/03/2021 39.6 36.0 - 46.0 % Final     Platelets   Date Value Ref Range Status   03/01/2022 203 140 - 450 10*3/mm3 Final   11/03/2021 175 140 - 440 10*3/uL Final      CT angiogram chest 2/18/2022:    1. Acute pulmonary emboli in the left lower lobe subsegmental pulmonary arteries. Questionable small subsegmental filling defects in the right upper lobe pulmonary artery. No CT evidence of acute right heart strain.  2. Negative for thoracic aortic aneurysm/dissection.  3. High-grade stenosis at the left subclavian artery origin.  4. Bibasilar subsegmental dependent atelectasis. Linear groundglass opacities in the bilateral lung apices may represent atelectasis or sequelae of previous infection.  5. Cholelithiasis.    Assessment & Plan     *Acute pulmonary emboli subsegmental pulmonary arteries by CT angiogram 2/18/2022  · Emboli developed during altered mental status/acute cholecystitis as provoking event  · Admission note indicates the patient was on estrogen but the patient states she has not taking estrogen for a number of years  · Possible prior history superficial phlebitis during previous pneumonia/bowel obstruction 1990s  · No familial history of clotting disorders or DVT    Hematology plan/recommendations:  With respect to the patient's incidental pulmonary emboli which were asymptomatic and small 2/18/2022 I think 6 months of anticoagulation is adequate.  Based on her history she has no other report of DVT (states she had clots in the early 1990s associated with an illness but denies taking blood thinner making me think  they were superficial).  She has no family history of DVT.  The February event was provoked by cholecystitis.  I do not see a strong indication for hypercoagulable profile.      Thank you for allowing me to participate in the care of this pleasant patient; I will see her back as needed.

## 2022-08-16 ENCOUNTER — HOSPITAL ENCOUNTER (OUTPATIENT)
Dept: MAMMOGRAPHY | Facility: HOSPITAL | Age: 77
Discharge: HOME | End: 2022-08-16
Admitting: FAMILY MEDICINE

## 2022-08-16 DIAGNOSIS — Z12.31 SCREENING MAMMOGRAM, ENCOUNTER FOR: ICD-10-CM

## 2022-08-16 PROCEDURE — 77067 SCR MAMMO BI INCL CAD: CPT

## 2022-08-16 PROCEDURE — 77063 BREAST TOMOSYNTHESIS BI: CPT

## 2022-09-20 ENCOUNTER — LAB (OUTPATIENT)
Dept: LAB | Facility: HOSPITAL | Age: 77
End: 2022-09-20

## 2022-09-20 ENCOUNTER — TRANSCRIBE ORDERS (OUTPATIENT)
Dept: ADMINISTRATIVE | Facility: HOSPITAL | Age: 77
End: 2022-09-20

## 2022-09-20 DIAGNOSIS — N18.2 CHRONIC KIDNEY DISEASE, STAGE II (MILD): Primary | ICD-10-CM

## 2022-09-20 DIAGNOSIS — N18.2 CHRONIC KIDNEY DISEASE, STAGE II (MILD): ICD-10-CM

## 2022-09-20 LAB
25(OH)D3 SERPL-MCNC: 35.9 NG/ML (ref 30–100)
ANION GAP SERPL CALCULATED.3IONS-SCNC: 10.2 MMOL/L (ref 5–15)
BASOPHILS # BLD AUTO: 0.08 10*3/MM3 (ref 0–0.2)
BASOPHILS NFR BLD AUTO: 1.2 % (ref 0–1.5)
BUN SERPL-MCNC: 20 MG/DL (ref 8–23)
BUN/CREAT SERPL: 15.9 (ref 7–25)
CALCIUM SPEC-SCNC: 9.4 MG/DL (ref 8.6–10.5)
CALCIUM SPEC-SCNC: 9.9 MG/DL (ref 8.6–10.5)
CHLORIDE SERPL-SCNC: 99 MMOL/L (ref 98–107)
CO2 SERPL-SCNC: 28.8 MMOL/L (ref 22–29)
CREAT SERPL-MCNC: 1.26 MG/DL (ref 0.57–1)
DEPRECATED RDW RBC AUTO: 42.6 FL (ref 37–54)
EGFRCR SERPLBLD CKD-EPI 2021: 43.8 ML/MIN/1.73
EOSINOPHIL # BLD AUTO: 0.19 10*3/MM3 (ref 0–0.4)
EOSINOPHIL NFR BLD AUTO: 2.8 % (ref 0.3–6.2)
ERYTHROCYTE [DISTWIDTH] IN BLOOD BY AUTOMATED COUNT: 12.9 % (ref 12.3–15.4)
GLUCOSE SERPL-MCNC: 83 MG/DL (ref 65–99)
HCT VFR BLD AUTO: 40.3 % (ref 34–46.6)
HGB BLD-MCNC: 13.2 G/DL (ref 12–15.9)
IMM GRANULOCYTES # BLD AUTO: 0.02 10*3/MM3 (ref 0–0.05)
IMM GRANULOCYTES NFR BLD AUTO: 0.3 % (ref 0–0.5)
LYMPHOCYTES # BLD AUTO: 2.27 10*3/MM3 (ref 0.7–3.1)
LYMPHOCYTES NFR BLD AUTO: 34 % (ref 19.6–45.3)
MCH RBC QN AUTO: 29.9 PG (ref 26.6–33)
MCHC RBC AUTO-ENTMCNC: 32.8 G/DL (ref 31.5–35.7)
MCV RBC AUTO: 91.2 FL (ref 79–97)
MONOCYTES # BLD AUTO: 0.62 10*3/MM3 (ref 0.1–0.9)
MONOCYTES NFR BLD AUTO: 9.3 % (ref 5–12)
NEUTROPHILS NFR BLD AUTO: 3.49 10*3/MM3 (ref 1.7–7)
NEUTROPHILS NFR BLD AUTO: 52.4 % (ref 42.7–76)
NRBC BLD AUTO-RTO: 0 /100 WBC (ref 0–0.2)
PLATELET # BLD AUTO: 182 10*3/MM3 (ref 140–450)
PMV BLD AUTO: 12.3 FL (ref 6–12)
POTASSIUM SERPL-SCNC: 4.9 MMOL/L (ref 3.5–5.2)
PTH-INTACT SERPL-MCNC: 54.6 PG/ML (ref 15–65)
RBC # BLD AUTO: 4.42 10*6/MM3 (ref 3.77–5.28)
SODIUM SERPL-SCNC: 138 MMOL/L (ref 136–145)
WBC NRBC COR # BLD: 6.67 10*3/MM3 (ref 3.4–10.8)

## 2022-09-20 PROCEDURE — 36415 COLL VENOUS BLD VENIPUNCTURE: CPT

## 2022-09-20 PROCEDURE — 80048 BASIC METABOLIC PNL TOTAL CA: CPT

## 2022-09-20 PROCEDURE — 83970 ASSAY OF PARATHORMONE: CPT

## 2022-09-20 PROCEDURE — 85025 COMPLETE CBC W/AUTO DIFF WBC: CPT

## 2022-09-20 PROCEDURE — 82306 VITAMIN D 25 HYDROXY: CPT

## 2022-12-13 RX ORDER — METOPROLOL SUCCINATE 50 MG/1
TABLET, EXTENDED RELEASE ORAL
Qty: 180 TABLET | Refills: 0 | Status: SHIPPED | OUTPATIENT
Start: 2022-12-13 | End: 2023-03-28

## 2023-03-13 ENCOUNTER — LAB (OUTPATIENT)
Dept: LAB | Facility: HOSPITAL | Age: 78
End: 2023-03-13
Payer: MEDICARE

## 2023-03-13 ENCOUNTER — TRANSCRIBE ORDERS (OUTPATIENT)
Dept: ADMINISTRATIVE | Facility: HOSPITAL | Age: 78
End: 2023-03-13
Payer: MEDICARE

## 2023-03-13 DIAGNOSIS — N18.30 STAGE 3 CHRONIC KIDNEY DISEASE, UNSPECIFIED WHETHER STAGE 3A OR 3B CKD: Primary | ICD-10-CM

## 2023-03-13 DIAGNOSIS — N18.30 STAGE 3 CHRONIC KIDNEY DISEASE, UNSPECIFIED WHETHER STAGE 3A OR 3B CKD: ICD-10-CM

## 2023-03-13 LAB
ANION GAP SERPL CALCULATED.3IONS-SCNC: 11.2 MMOL/L (ref 5–15)
BASOPHILS # BLD AUTO: 0.06 10*3/MM3 (ref 0–0.2)
BASOPHILS NFR BLD AUTO: 0.5 % (ref 0–1.5)
BUN SERPL-MCNC: 18 MG/DL (ref 8–23)
BUN/CREAT SERPL: 20.9 (ref 7–25)
CALCIUM SPEC-SCNC: 9.9 MG/DL (ref 8.6–10.5)
CALCIUM SPEC-SCNC: 9.9 MG/DL (ref 8.6–10.5)
CHLORIDE SERPL-SCNC: 103 MMOL/L (ref 98–107)
CO2 SERPL-SCNC: 26.8 MMOL/L (ref 22–29)
CREAT SERPL-MCNC: 0.86 MG/DL (ref 0.57–1)
DEPRECATED RDW RBC AUTO: 42.4 FL (ref 37–54)
EGFRCR SERPLBLD CKD-EPI 2021: 69.2 ML/MIN/1.73
EOSINOPHIL # BLD AUTO: 0.16 10*3/MM3 (ref 0–0.4)
EOSINOPHIL NFR BLD AUTO: 1.4 % (ref 0.3–6.2)
ERYTHROCYTE [DISTWIDTH] IN BLOOD BY AUTOMATED COUNT: 12.7 % (ref 12.3–15.4)
GLUCOSE SERPL-MCNC: 93 MG/DL (ref 65–99)
HCT VFR BLD AUTO: 40.5 % (ref 34–46.6)
HGB BLD-MCNC: 13.1 G/DL (ref 12–15.9)
IMM GRANULOCYTES # BLD AUTO: 0.08 10*3/MM3 (ref 0–0.05)
IMM GRANULOCYTES NFR BLD AUTO: 0.7 % (ref 0–0.5)
LYMPHOCYTES # BLD AUTO: 2.24 10*3/MM3 (ref 0.7–3.1)
LYMPHOCYTES NFR BLD AUTO: 19.5 % (ref 19.6–45.3)
MCH RBC QN AUTO: 30.3 PG (ref 26.6–33)
MCHC RBC AUTO-ENTMCNC: 32.3 G/DL (ref 31.5–35.7)
MCV RBC AUTO: 93.8 FL (ref 79–97)
MONOCYTES # BLD AUTO: 0.67 10*3/MM3 (ref 0.1–0.9)
MONOCYTES NFR BLD AUTO: 5.8 % (ref 5–12)
NEUTROPHILS NFR BLD AUTO: 72.1 % (ref 42.7–76)
NEUTROPHILS NFR BLD AUTO: 8.25 10*3/MM3 (ref 1.7–7)
NRBC BLD AUTO-RTO: 0 /100 WBC (ref 0–0.2)
PLATELET # BLD AUTO: 202 10*3/MM3 (ref 140–450)
PMV BLD AUTO: 12.9 FL (ref 6–12)
POTASSIUM SERPL-SCNC: 4.5 MMOL/L (ref 3.5–5.2)
PTH-INTACT SERPL-MCNC: 87.8 PG/ML (ref 15–65)
RBC # BLD AUTO: 4.32 10*6/MM3 (ref 3.77–5.28)
SODIUM SERPL-SCNC: 141 MMOL/L (ref 136–145)
WBC NRBC COR # BLD: 11.46 10*3/MM3 (ref 3.4–10.8)

## 2023-03-13 PROCEDURE — 36415 COLL VENOUS BLD VENIPUNCTURE: CPT

## 2023-03-13 PROCEDURE — 80048 BASIC METABOLIC PNL TOTAL CA: CPT

## 2023-03-13 PROCEDURE — 82306 VITAMIN D 25 HYDROXY: CPT

## 2023-03-13 PROCEDURE — 85025 COMPLETE CBC W/AUTO DIFF WBC: CPT

## 2023-03-13 PROCEDURE — 83970 ASSAY OF PARATHORMONE: CPT

## 2023-03-14 LAB — 25(OH)D3 SERPL-MCNC: 36.1 NG/ML (ref 30–100)

## 2023-03-28 RX ORDER — METOPROLOL SUCCINATE 50 MG/1
TABLET, EXTENDED RELEASE ORAL
Qty: 180 TABLET | Refills: 0 | Status: SHIPPED | OUTPATIENT
Start: 2023-03-28

## 2023-03-28 NOTE — TELEPHONE ENCOUNTER
Rx Refill Note  Requested Prescriptions     Pending Prescriptions Disp Refills   • metoprolol succinate XL (TOPROL-XL) 50 MG 24 hr tablet [Pharmacy Med Name: METOPROLOL SUCCINATE ER 50 MG Tablet Extended Release 24 Hour] 180 tablet 0     Sig: TAKE 1 TABLET TWICE DAILY      Last office visit with prescribing clinician: 7/19/2022   Last telemedicine visit with prescribing clinician: 7/25/2023   Next office visit with prescribing clinician: Visit date not found                         Would you like a call back once the refill request has been completed: [] Yes [] No    If the office needs to give you a call back, can they leave a voicemail: [] Yes [] No    Kathy Berman MA  03/28/23, 09:43 EDT

## 2023-08-14 ENCOUNTER — OFFICE VISIT (OUTPATIENT)
Dept: CARDIOLOGY | Facility: CLINIC | Age: 78
End: 2023-08-14
Payer: MEDICARE

## 2023-08-14 VITALS
WEIGHT: 156 LBS | HEART RATE: 64 BPM | HEIGHT: 58 IN | RESPIRATION RATE: 16 BRPM | BODY MASS INDEX: 32.75 KG/M2 | OXYGEN SATURATION: 93 %

## 2023-08-14 DIAGNOSIS — Z86.718 HISTORY OF VENOUS THROMBOEMBOLISM: ICD-10-CM

## 2023-08-14 DIAGNOSIS — I26.94 MULTIPLE SUBSEGMENTAL PULMONARY EMBOLI WITHOUT ACUTE COR PULMONALE: ICD-10-CM

## 2023-08-14 DIAGNOSIS — I77.1 SUBCLAVIAN ARTERY STENOSIS: ICD-10-CM

## 2023-08-14 DIAGNOSIS — I10 ESSENTIAL HYPERTENSION: Primary | ICD-10-CM

## 2023-08-14 RX ORDER — TRAMADOL HYDROCHLORIDE 50 MG/1
TABLET ORAL
COMMUNITY

## 2023-08-14 NOTE — PROGRESS NOTES
"      [facility] CARDIOLOGY  [address]  [address]  [place] [address]  Phone: [phone]  Fax: [phone]  Patient Name: Janna Moy  :1945  Age: 78 y.o.  Primary Cardiologist: Gonzalez Bautista MD  Encounter Provider:  YAKOV Sanderson    History of Present Illness     Janna Moy is a 78 y.o.  female whose medical history includes hypertension, history of DASHA, subclavian artery stenosis, and history of DVT.  She is followed in our office by Dr. Bautista for hypertension and history of DVT/PE. I have reviewed the past medical records in preparation of today's visit.     23 Follow-up:  She is here for 1 year follow-up and I am seeing her for the first time today.  She has been doing well.  Her blood pressure today was checked in her right arm.  She is not checking it at home but denies any headaches.  She is occasionally lightheaded with standing but has had no falls.  She is no longer on apixaban for history of PE and DVT; she states this was stopped hematology.  She denies chest pain, orthopnea, or leg swelling.  She has occasional but very brief palpitations at rest.  These are not new and not worrisome to her.  She has dyspnea with exertion which is chronic and stable.  She is taking her medications as prescribed.    Data Review     The following data was reviewed by YAKOV Sanderson on 23:    Vital Signs:   Pulse 64   Resp 16   Ht 147.3 cm (58\")   Wt 70.8 kg (156 lb)   SpO2 93%   BMI 32.60 kg/mý       Weight:  Wt Readings from Last 3 Encounters:   23 70.8 kg (156 lb)   22 71 kg (156 lb 9.6 oz)   22 69.4 kg (152 lb 14.4 oz)     Body mass index is 32.6 kg/mý.    Below is a summary of pertinent cardiology findings:  2019 she was evaluated for preoperative risk assessment due to some nonspecific ST flattening on EKG; this was felt to be due to hypertension.  She had a normal perfusion stress " study at that time.  June 2020 she was again seen for preoperative cardiac risk assessment; no other testing needed at that time.  2021 evaluated by Dr. Bautista and started on lisinopril-HCTZ for elevated blood pressure; this had to be stopped due to DASHA.  February 2022 she was found to have multiple subsegmental pulmonary emboli, and incidental finding of a left subclavian stenosis with disparate blood pressures in upper extremities; she has been on apixaban since that time.  She did have acute cholecystitis and underwent cholecystectomy at that time.    Labs:  11/04/2022:  cr 0.9, K 4.9, otherwise unremarkable CMP, Hgb 12.3, Plt 207      ECG 12 Lead    Date/Time: 8/14/2023 11:28 AM  Performed by: Bernie Miles APRN  Authorized by: Bernie Miles APRN   Comparison: compared with previous ECG from 7/19/2022  Similar to previous ECG  Rhythm: sinus rhythm  Rate: normal  BPM: 64  T flattening: III    Clinical impression: normal ECG        Medications     Allergies as of 08/14/2023 - Reviewed 08/14/2023   Allergen Reaction Noted    Codeine Delirium 06/03/2014    Sulfa antibiotics Other (See Comments) 07/16/2012       Current Outpatient Medications   Medication Instructions    acetaminophen (TYLENOL) 650 mg, Oral, Every 8 Hours PRN    cholecalciferol (VITAMIN D3) 400 Units, Oral    citalopram (CELEXA) 20 mg, Oral, Daily    famotidine (PEPCID) 40 mg, Oral, Daily PRN    levothyroxine (SYNTHROID, LEVOTHROID) 88 mcg, Oral, Daily    lovastatin (MEVACOR) 20 mg, Oral, Nightly    metoprolol succinate XL (TOPROL-XL) 50 MG 24 hr tablet TAKE 1 TABLET TWICE DAILY    ondansetron (ZOFRAN) 4 mg, Oral, 4 Times Daily PRN    traMADol (ULTRAM) 50 MG tablet TAKE 1 TABLET BY MOUTH EVERY 6 (SIX) HOURS AS NEEDED (HAND PAIN). MAX DAILY AMOUNT: 200 MG        Past History, Review of Systems, Exam     Past Medical History:   Diagnosis Date    Acute renal failure (ARF)     Arthritis     Delirium due to another medical  condition 2022    Functional diarrhea 2020    GERD (gastroesophageal reflux disease)     History of total knee arthroplasty     History of venous thromboembolism 2022    Hyperlipidemia     Hypertension     Hypothyroidism     Subclavian artery stenosis        Past Surgical History:   has a past surgical history that includes Colonoscopy; Tonsillectomy; Hysterectomy; Replacement total knee (Left); Replacement total knee (Right); Total hip arthroplasty (Left); Joint replacement; Eye surgery; cholecystectomy with intraoperative cholangiogram (N/A, 2022); and Cholecystectomy.     Social History     Socioeconomic History    Marital status:     Number of children: 3    Years of education: High school   Tobacco Use    Smoking status: Former     Packs/day: 1.00     Years: 10.00     Pack years: 10.00     Types: Cigarettes     Quit date:      Years since quittin.6    Smokeless tobacco: Never   Vaping Use    Vaping Use: Never used   Substance and Sexual Activity    Alcohol use: Not Currently    Drug use: Never    Sexual activity: Not Currently       Review of Systems   Cardiovascular:  Positive for dyspnea on exertion and palpitations. Negative for chest pain, claudication, cyanosis, irregular heartbeat, leg swelling, near-syncope, orthopnea, paroxysmal nocturnal dyspnea and syncope.     Vitals reviewed.   Constitutional:       Appearance: Not in distress.   Eyes:      Conjunctiva/sclera: Conjunctivae normal.      Pupils: Pupils are equal, round, and reactive to light.   HENT:      Head: Normocephalic.      Nose: Nose normal.    Mouth/Throat:      Pharynx: Oropharynx is clear.   Neck:      Vascular: JVD normal.   Pulmonary:      Effort: Pulmonary effort is normal.      Breath sounds: Normal breath sounds. No wheezing. No rhonchi. No rales.   Cardiovascular:      Normal rate. Regular rhythm. Normal S1. Normal S2.       Murmurs: There is no murmur.   Pulses:     Intact distal pulses.    Edema:     Peripheral edema absent.   Abdominal:      General: Bowel sounds are normal. There is no distension.      Palpations: Abdomen is soft.      Tenderness: There is no abdominal tenderness.   Musculoskeletal: Normal range of motion.      Cervical back: Normal range of motion and neck supple. Skin:     General: Skin is warm and dry.   Neurological:      Mental Status: Alert and oriented to person, place and time.   Psychiatric:         Attention and Perception: Attention normal.         Mood and Affect: Mood normal.         Speech: Speech normal.         Behavior: Behavior is cooperative.        Assessment and Plan     Assessment:  1. Essential hypertension    2. Subclavian artery stenosis    3. History of venous thromboembolism    4. Multiple subsegmental pulmonary emboli without acute cor pulmonale         Hypertension: This is controlled on metoprolol succinate 50 mg twice daily.  She had DASHA and lisinopril-HCTZ.  She also has left subclavian stenosis and her blood pressure should be checked on the right arm.  Subclavian artery stenosis: This is on the left; her pulse is weaker on this side.  Her blood pressure should be checked in the right arm.  History of DVT and PE: This occurred in February 2022 following COVID-19 infection.  She was started on apixaban and this has been stopped by hematology.    Ms. Moy is a patient of Dr. Bautista's with history of hypertension and left side subclavian stenosis; her blood pressure should be checked on the right side.  She also had history of DVT and PE in the setting of COVID-19 infection.  She was evaluated by hematology and her apixaban has now been stopped.  She is doing well from a cardiac standpoint.  I will make no medication changes and have her see Dr. Bautista in 1 year.    Return in about 1 year (around 8/14/2024) for Dr. Bautista.  Orders Placed This Encounter   Procedures    ECG 12 Lead      No orders of the defined types were placed in this  encounter.        Thank you for the opportunity to participate in this patient's care.    YAKOV Gonzalez    This office note has been dictated.

## 2023-10-11 RX ORDER — METOPROLOL SUCCINATE 50 MG/1
TABLET, EXTENDED RELEASE ORAL
Qty: 180 TABLET | Refills: 3 | Status: SHIPPED | OUTPATIENT
Start: 2023-10-11

## 2023-12-04 ENCOUNTER — TRANSCRIBE ORDERS (OUTPATIENT)
Dept: ADMINISTRATIVE | Facility: HOSPITAL | Age: 78
End: 2023-12-04
Payer: MEDICARE

## 2023-12-04 DIAGNOSIS — Z12.31 VISIT FOR SCREENING MAMMOGRAM: Primary | ICD-10-CM

## 2023-12-22 ENCOUNTER — HOSPITAL ENCOUNTER (OUTPATIENT)
Dept: MAMMOGRAPHY | Facility: HOSPITAL | Age: 78
Discharge: HOME | End: 2023-12-22
Admitting: FAMILY MEDICINE
Payer: MEDICARE

## 2023-12-22 DIAGNOSIS — Z12.31 VISIT FOR SCREENING MAMMOGRAM: ICD-10-CM

## 2023-12-22 PROCEDURE — 77067 SCR MAMMO BI INCL CAD: CPT

## 2023-12-22 PROCEDURE — 77063 BREAST TOMOSYNTHESIS BI: CPT

## 2024-08-19 NOTE — PROGRESS NOTES
RM:________     PCP: Harpreet Alcantara MD    : 1945  AGE: 79 y.o.  EST PATIENT     REASON FOR VISIT/  CC:        BP Readings from Last 3 Encounters:   22 159/82   22 124/80   22 120/68      Wt Readings from Last 3 Encounters:   23 70.8 kg (156 lb)   22 71 kg (156 lb 9.6 oz)   22 69.4 kg (152 lb 14.4 oz)        WT: ____________ BP: __________L __________R HR______    CHEST PAIN: _____________    SOA: _____________PALPS: _______________     LIGHTHEADED: ___________FATIGUE: ________________ EDEMA __________    ALLERGIES:Codeine and Sulfa antibiotics SMOKING HISTORY:  Social History     Tobacco Use    Smoking status: Former     Current packs/day: 0.00     Average packs/day: 1 pack/day for 10.0 years (10.0 ttl pk-yrs)     Types: Cigarettes     Start date:      Quit date:      Years since quittin.6    Smokeless tobacco: Never   Vaping Use    Vaping status: Never Used   Substance Use Topics    Alcohol use: Not Currently    Drug use: Never     CAFFEINE USE_________________  ALCOHOL ______________________

## 2024-08-20 ENCOUNTER — OFFICE VISIT (OUTPATIENT)
Dept: CARDIOLOGY | Facility: CLINIC | Age: 79
End: 2024-08-20
Payer: MEDICARE

## 2024-08-20 VITALS
HEART RATE: 60 BPM | WEIGHT: 154.1 LBS | DIASTOLIC BLOOD PRESSURE: 80 MMHG | BODY MASS INDEX: 28.36 KG/M2 | HEIGHT: 62 IN | SYSTOLIC BLOOD PRESSURE: 130 MMHG

## 2024-08-20 DIAGNOSIS — I77.1 SUBCLAVIAN ARTERY STENOSIS: ICD-10-CM

## 2024-08-20 DIAGNOSIS — I10 ESSENTIAL HYPERTENSION: Primary | ICD-10-CM

## 2024-08-20 DIAGNOSIS — Z86.718 HISTORY OF VENOUS THROMBOEMBOLISM: ICD-10-CM

## 2024-08-20 DIAGNOSIS — I26.94 MULTIPLE SUBSEGMENTAL PULMONARY EMBOLI WITHOUT ACUTE COR PULMONALE: ICD-10-CM

## 2024-08-20 PROCEDURE — 1159F MED LIST DOCD IN RCRD: CPT | Performed by: INTERNAL MEDICINE

## 2024-08-20 PROCEDURE — 1160F RVW MEDS BY RX/DR IN RCRD: CPT | Performed by: INTERNAL MEDICINE

## 2024-08-20 PROCEDURE — 93000 ELECTROCARDIOGRAM COMPLETE: CPT | Performed by: INTERNAL MEDICINE

## 2024-08-20 PROCEDURE — 3075F SYST BP GE 130 - 139MM HG: CPT | Performed by: INTERNAL MEDICINE

## 2024-08-20 PROCEDURE — 99214 OFFICE O/P EST MOD 30 MIN: CPT | Performed by: INTERNAL MEDICINE

## 2024-08-20 PROCEDURE — 3079F DIAST BP 80-89 MM HG: CPT | Performed by: INTERNAL MEDICINE

## 2024-08-20 NOTE — PROGRESS NOTES
Date of Office Visit: 24  Encounter Provider: Gonzalez Bautista MD  Place of Service: [facility] CARDIOLOGY  Patient Name: Janna Moy  :1945    Chief Complaint   Patient presents with    Hypertension     HPI:     Ms. Moy is 79 y.o. and presents today in follow up.  She established care with me in . I have reviewed prior notes and there are no changes except for any new updates described below. I have also reviewed any information entered into the medical record by the patient or by ancillary staff.     In 2019, she was evaluated for preoperative risk assessment due to an abnormal EKG (which showed some nonspecific ST flattening, likely due to hypertension). She had a normal perfusion stress test at that time and had surgery without issue.  She was then seen again in 2020 for preoperative risk assessment for the same surgery, and no testing was ordered, and surgery proceeded without issue.     When she established care with me, I adjusted her antihypertensives. Unfortunately, she developed DASHA while on lisinopril-HCTZ.     She presented in 2022 with neck pain and was incidentally noted to have multiple subsegmental pulmonary emboli.  She was also incidentally noted to have left subclavian stenosis with disparate blood pressures in the upper extremities.  She was treated with apixaban.  Shortly after that, she developed acute cholecystitis and required cholecystectomy.    From cardiac standpoint, she is doing well.  She denies chest pain, shortness of breath, palpitations, lightheadedness, syncope, orthopnea, or leg swelling.  She denies any vertigo or neurologic symptoms, and denies arm claudication, even with activities that involve raising her left arm above her head.    Past Medical History:   Diagnosis Date    Acute renal failure (ARF)     due to ACE and HCTZ    Arthritis     Delirium due to another medical condition 2022     Functional diarrhea 2020    GERD (gastroesophageal reflux disease)     History of total knee arthroplasty     History of venous thromboembolism 2022    Hyperlipidemia     Hypertension     Hypothyroidism     Subclavian artery stenosis     left       Past Surgical History:   Procedure Laterality Date    CHOLECYSTECTOMY      CHOLECYSTECTOMY WITH INTRAOPERATIVE CHOLANGIOGRAM N/A 2022    Procedure: CHOLECYSTECTOMY LAPAROSCOPIC INTRAOPERATIVE CHOLANGIOGRAM;  Surgeon: Ijeoma Becker MD;  [facility];  Service: General;  Laterality: N/A;    COLONOSCOPY      EYE SURGERY      HYSTERECTOMY      JOINT REPLACEMENT      REPLACEMENT TOTAL KNEE Left     REPLACEMENT TOTAL KNEE Right     TONSILLECTOMY      TOTAL HIP ARTHROPLASTY Left        Social History     Socioeconomic History    Marital status:     Number of children: 3    Years of education: High school   Tobacco Use    Smoking status: Former     Current packs/day: 0.00     Average packs/day: 1 pack/day for 10.0 years (10.0 ttl pk-yrs)     Types: Cigarettes     Start date:      Quit date:      Years since quittin.6     Passive exposure: Past    Smokeless tobacco: Never   Vaping Use    Vaping status: Never Used   Substance and Sexual Activity    Alcohol use: Not Currently    Drug use: Never    Sexual activity: Not Currently       Family History   Problem Relation Age of Onset    Cancer Mother     Cancer Father     Hypertension Father     Breast cancer Neg Hx     Colon cancer Neg Hx     Colon polyps Neg Hx        Review of Systems   Constitutional: Positive for malaise/fatigue.   All other systems reviewed and are negative.      Allergies   Allergen Reactions    Codeine Delirium     Other reaction(s): Delirium    Sulfa Antibiotics Other (See Comments)     Pt had joint pain  Pt had joint pain  Pt had joint pain         Current Outpatient Medications:     acetaminophen (TYLENOL) 650 MG 8 hr tablet, Take 1 tablet by mouth Every 8  "(Eight) Hours As Needed., Disp: , Rfl:     CALCIUM PO, Take  by mouth., Disp: , Rfl:     cholecalciferol (VITAMIN D3) 10 MCG (400 UNIT) tablet, Take 1 tablet by mouth., Disp: , Rfl:     citalopram (CeleXA) 20 MG tablet, Take 1 tablet by mouth Daily., Disp: , Rfl:     famotidine (PEPCID) 40 MG tablet, Take 1 tablet by mouth Daily As Needed., Disp: , Rfl:     levothyroxine (SYNTHROID, LEVOTHROID) 88 MCG tablet, Take 1 tablet by mouth Daily., Disp: , Rfl:     lovastatin (MEVACOR) 20 MG tablet, Take 1 tablet by mouth Every Night., Disp: , Rfl:     metoprolol succinate XL (TOPROL-XL) 50 MG 24 hr tablet, TAKE 1 TABLET TWICE DAILY, Disp: 180 tablet, Rfl: 3    ondansetron (ZOFRAN) 4 MG tablet, Take 1 tablet by mouth 4 (Four) Times a Day As Needed for Nausea or Vomiting., Disp: 10 tablet, Rfl: 0    traMADol (ULTRAM) 50 MG tablet, TAKE 1 TABLET BY MOUTH EVERY 6 (SIX) HOURS AS NEEDED (HAND PAIN). MAX DAILY AMOUNT: 200 MG, Disp: , Rfl:       Objective:     Vitals:    08/20/24 1103 08/20/24 1125   BP: 156/88 130/80   BP Location: Right arm    Pulse: 60    Weight: 69.9 kg (154 lb 1.6 oz)    Height: 157.5 cm (62\")        Body mass index is 28.19 kg/m².    Vitals reviewed.   Constitutional:       Appearance: Healthy appearance. Well-developed and not in distress.   Eyes:      Conjunctiva/sclera: Conjunctivae normal.   HENT:      Head: Normocephalic.      Nose: Nose normal.   Neck:      Vascular: No JVD. JVD normal.   Pulmonary:      Effort: Pulmonary effort is normal.      Breath sounds: Normal breath sounds.   Cardiovascular:      Normal rate. Regular rhythm.      Murmurs: There is no murmur.      Comments: Delayed left radial/ulnar pulses c/w right; left subclavian bruit  Edema:     Peripheral edema absent.   Abdominal:      Palpations: Abdomen is soft.      Tenderness: There is no abdominal tenderness.   Musculoskeletal: Normal range of motion.      Cervical back: Normal range of motion. Skin:     General: Skin is warm and dry. "   Neurological:      General: No focal deficit present.      Mental Status: Oriented to person, place, and time.      Cranial Nerves: No cranial nerve deficit.   Psychiatric:         Behavior: Behavior normal.         Thought Content: Thought content normal.         Judgment: Judgment normal.           ECG 12 Lead    Date/Time: 8/20/2024 11:25 AM  Performed by: Gonzalez Bautista MD    Authorized by: Gonzalez Bautista MD  Comparison: compared with previous ECG   Similar to previous ECG  Rhythm: sinus rhythm  Conduction: conduction normal  ST Segments: ST segments normal  T Waves: T waves normal  QRS axis: normal  Other: no other findings    Clinical impression: normal ECG            Assessment:       Diagnosis Plan   1. Essential hypertension        2. Subclavian artery stenosis        3. History of venous thromboembolism        4. Multiple subsegmental pulmonary emboli without acute cor pulmonale             Plan:      1/2.  Her blood pressure should only ever be checked in her right arm as she has significant left subclavian stenosis.  However, she has absolutely no symptoms of arm claudication or subclavian steal, and as such does not require intervention.  When she was on lisinopril-HCTZ in the past, she developed acute renal failure, so that should never be restarted.  Her blood pressure was a bit high upon arrival but when I rechecked it, it was within goal for age/sex/weight.     3/4.  She was diagnosed with multiple small PEs in February without any evidence of DVT on ultrasound. She was seen by hematology who recommended an appropriate course of OAC; she is no longer anticoagulated.     Sincerely,       Gonzalez Bautista MD

## 2024-08-31 ENCOUNTER — TRANSCRIBE ORDERS (OUTPATIENT)
Dept: ADMINISTRATIVE | Facility: HOSPITAL | Age: 79
End: 2024-08-31
Payer: MEDICARE

## 2024-08-31 ENCOUNTER — HOSPITAL ENCOUNTER (OUTPATIENT)
Dept: GENERAL RADIOLOGY | Facility: HOSPITAL | Age: 79
Discharge: HOME | End: 2024-08-31
Payer: MEDICARE

## 2024-08-31 DIAGNOSIS — R10.9 RIGHT FLANK PAIN: ICD-10-CM

## 2024-08-31 DIAGNOSIS — R10.9 RIGHT FLANK PAIN: Primary | ICD-10-CM

## 2024-08-31 PROCEDURE — 72110 X-RAY EXAM L-2 SPINE 4/>VWS: CPT

## 2024-08-31 PROCEDURE — 72072 X-RAY EXAM THORAC SPINE 3VWS: CPT

## 2025-06-23 ENCOUNTER — TRANSCRIBE ORDERS (OUTPATIENT)
Dept: ULTRASOUND IMAGING | Facility: HOSPITAL | Age: 80
End: 2025-06-23
Payer: MEDICARE

## 2025-06-23 ENCOUNTER — TELEPHONE (OUTPATIENT)
Dept: GASTROENTEROLOGY | Facility: CLINIC | Age: 80
End: 2025-06-23

## 2025-06-23 DIAGNOSIS — Z12.31 SCREENING MAMMOGRAM, ENCOUNTER FOR: Primary | ICD-10-CM

## 2025-06-23 NOTE — TELEPHONE ENCOUNTER
Caller: Janna Moy A    Relationship to patient: Self    Best call back number: [phone]    Patient is needing: PATIENT STATES SHE RECEIVED A LETTER TELLING HER SHE WAS DUE FOR HER 10 YEAR COLONOSCOPY WITH DR. LINUS MONTENEGRO.  PLEASE CALL BACK TO SCHEDULE AND/OR ADVISE.

## 2025-07-01 ENCOUNTER — TELEPHONE (OUTPATIENT)
Dept: CARDIOLOGY | Facility: CLINIC | Age: 80
End: 2025-07-01
Payer: MEDICARE

## 2025-07-15 ENCOUNTER — HOSPITAL ENCOUNTER (OUTPATIENT)
Dept: MAMMOGRAPHY | Facility: HOSPITAL | Age: 80
Discharge: HOME | End: 2025-07-15
Admitting: FAMILY MEDICINE
Payer: MEDICARE

## 2025-07-15 DIAGNOSIS — Z12.31 SCREENING MAMMOGRAM, ENCOUNTER FOR: ICD-10-CM

## 2025-07-15 PROCEDURE — 77063 BREAST TOMOSYNTHESIS BI: CPT

## 2025-07-15 PROCEDURE — 77067 SCR MAMMO BI INCL CAD: CPT

## 2025-07-16 PROCEDURE — 77067 SCR MAMMO BI INCL CAD: CPT | Performed by: RADIOLOGY

## 2025-07-16 PROCEDURE — 77063 BREAST TOMOSYNTHESIS BI: CPT | Performed by: RADIOLOGY

## 2025-07-24 ENCOUNTER — TELEPHONE (OUTPATIENT)
Dept: CARDIOLOGY | Facility: CLINIC | Age: 80
End: 2025-07-24
Payer: MEDICARE

## 2025-08-08 ENCOUNTER — TELEPHONE (OUTPATIENT)
Dept: CARDIOLOGY | Facility: CLINIC | Age: 80
End: 2025-08-08
Payer: MEDICARE

## 2025-08-22 ENCOUNTER — TELEPHONE (OUTPATIENT)
Dept: CARDIOLOGY | Facility: CLINIC | Age: 80
End: 2025-08-22
Payer: MEDICARE

## (undated) DEVICE — SYR LUERLOK 30CC

## (undated) DEVICE — ENDOPATH XCEL UNIVERSAL TROCAR STABLILITY SLEEVES: Brand: ENDOPATH XCEL

## (undated) DEVICE — PK LAP GEN 90

## (undated) DEVICE — SUT VIC 0 TN 27IN DYED JTN0G

## (undated) DEVICE — ENDOPATH XCEL BLADELESS TROCARS WITH STABILITY SLEEVES: Brand: ENDOPATH XCEL

## (undated) DEVICE — PATIENT RETURN ELECTRODE, SINGLE-USE, CONTACT QUALITY MONITORING, ADULT, WITH 9FT CORD, FOR PATIENTS WEIGING OVER 33LBS. (15KG): Brand: MEGADYNE

## (undated) DEVICE — ENDOPATH PNEUMONEEDLE INSUFFLATION NEEDLES WITH LUER LOCK CONNECTORS 120MM: Brand: ENDOPATH

## (undated) DEVICE — TBG PENCL TELESCP MEGADYNE SMOKE EVAC 10FT

## (undated) DEVICE — GOWN ,SIRUS,NONREINFORCED SMALL: Brand: MEDLINE

## (undated) DEVICE — 2, DISPOSABLE SUCTION/IRRIGATOR WITH DISPOSABLE TIP: Brand: STRYKEFLOW

## (undated) DEVICE — SYR LUERLOK 20CC BX/50

## (undated) DEVICE — TBG INSUFL W FLTR STRL

## (undated) DEVICE — APPL CHLORAPREP HI/LITE 26ML ORNG

## (undated) DEVICE — LAPAROSCOPIC SMOKE FILTRATION SYSTEM: Brand: PALL LAPAROSHIELD® PLUS LAPAROSCOPIC SMOKE FILTRATION SYSTEM

## (undated) DEVICE — CATH IV INSYTE AUTOGARD 14G 1 1/2IN ORNG

## (undated) DEVICE — DRP C/ARM 41X74IN

## (undated) DEVICE — Device: Brand: MEDEX

## (undated) DEVICE — TRAP FLD MINIVAC MEGADYNE 100ML

## (undated) DEVICE — UNDYED BRAIDED (POLYGLACTIN 910), SYNTHETIC ABSORBABLE SUTURE: Brand: COATED VICRYL

## (undated) DEVICE — DECANTER: Brand: UNBRANDED

## (undated) DEVICE — DECANT BG O JET

## (undated) DEVICE — CONTAINER,SPECIMEN,OR STERILE,4OZ: Brand: MEDLINE

## (undated) DEVICE — LAPAROSCOPIC SCOPE WARMER: Brand: DEROYAL

## (undated) DEVICE — CATH CHOLANG 4.5F18IN BRGNDY

## (undated) DEVICE — GLV SURG SENSICARE POLYISPRN W/ALOE PF LF 6.5 GRN STRL

## (undated) DEVICE — ST EXT IV TBG W SECUR LK 20IN

## (undated) DEVICE — ADHS SKIN PREMIERPRO EXOFIN TOPICAL HI/VISC .5ML

## (undated) DEVICE — ENDOCUT SCISSOR TIP, DISPOSABLE: Brand: RENEW